# Patient Record
Sex: MALE | Race: WHITE | Employment: OTHER | URBAN - NONMETROPOLITAN AREA
[De-identification: names, ages, dates, MRNs, and addresses within clinical notes are randomized per-mention and may not be internally consistent; named-entity substitution may affect disease eponyms.]

---

## 2017-02-23 ENCOUNTER — OFFICE VISIT (OUTPATIENT)
Dept: OPTOMETRY | Age: 67
End: 2017-02-23

## 2017-02-23 DIAGNOSIS — H52.03 HYPEROPIA OF BOTH EYES WITH ASTIGMATISM AND PRESBYOPIA: Primary | ICD-10-CM

## 2017-02-23 DIAGNOSIS — H52.4 HYPEROPIA OF BOTH EYES WITH ASTIGMATISM AND PRESBYOPIA: Primary | ICD-10-CM

## 2017-02-23 DIAGNOSIS — H52.203 HYPEROPIA OF BOTH EYES WITH ASTIGMATISM AND PRESBYOPIA: Primary | ICD-10-CM

## 2017-02-23 PROCEDURE — 92014 COMPRE OPH EXAM EST PT 1/>: CPT | Performed by: OPTOMETRIST

## 2017-02-23 RX ORDER — BENOXINATE HCL/FLUORESCEIN SOD 0.4%-0.25%
1 DROPS OPHTHALMIC (EYE) ONCE
Status: COMPLETED | OUTPATIENT
Start: 2017-02-23 | End: 2017-02-23

## 2017-02-23 RX ADMIN — Medication 1 DROP: at 14:46

## 2017-02-23 ASSESSMENT — REFRACTION_CURRENTRX
OD_BASECURVE: 8.6
OD_BRAND: CIBA: AIR OPTIX FOR ASTIGMATISM
OS_BASECURVE: 8.6
OS_SPHERE: +3.25
OS_BRAND: CIBA: AIR OPTIX FOR ASTIGMATISM
OD_SPHERE: +1.25

## 2017-02-23 ASSESSMENT — REFRACTION_MANIFEST
OD_SPHERE: +1.50
OS_CYLINDER: -0.75
OD_ADD: +2.25
OS_AXIS: 095
OS_SPHERE: +1.75
OS_ADD: +2.25
OD_CYLINDER: -0.50
OD_AXIS: 060

## 2017-02-23 ASSESSMENT — TONOMETRY
OS_IOP_MMHG: 16
IOP_METHOD: APPLANATION W FLURESS DROP
OD_IOP_MMHG: 16

## 2017-02-23 ASSESSMENT — REFRACTION_WEARINGRX
OD_CYLINDER: -0.50
SPECS_TYPE: NOT FILLED
OS_SPHERE: +1.75
OD_SPHERE: +1.25
OD_AXIS: 060
OS_ADD: +2.25
OD_ADD: +2.25
OS_AXIS: 095
OS_CYLINDER: -0.50

## 2017-02-23 ASSESSMENT — SLIT LAMP EXAM - LIDS
COMMENTS: NORMAL
COMMENTS: NORMAL

## 2017-02-23 ASSESSMENT — VISUAL ACUITY
OD_SC: 20/60
OS_SC: 20/50
METHOD: SNELLEN - LINEAR

## 2018-08-20 ENCOUNTER — HOSPITAL ENCOUNTER (OUTPATIENT)
Dept: NON INVASIVE DIAGNOSTICS | Age: 68
Discharge: HOME | End: 2018-08-20
Payer: MEDICARE

## 2018-08-20 LAB
LV EF: 55 %
LVEF MODALITY: NORMAL

## 2018-08-20 PROCEDURE — 93306 TTE W/DOPPLER COMPLETE: CPT

## 2019-03-11 ENCOUNTER — OFFICE VISIT (OUTPATIENT)
Dept: OPTOMETRY | Age: 69
End: 2019-03-11
Payer: COMMERCIAL

## 2019-03-11 DIAGNOSIS — H52.203 HYPEROPIA OF BOTH EYES WITH ASTIGMATISM AND PRESBYOPIA: Primary | ICD-10-CM

## 2019-03-11 DIAGNOSIS — H52.4 HYPEROPIA OF BOTH EYES WITH ASTIGMATISM AND PRESBYOPIA: Primary | ICD-10-CM

## 2019-03-11 DIAGNOSIS — H52.03 HYPEROPIA OF BOTH EYES WITH ASTIGMATISM AND PRESBYOPIA: Primary | ICD-10-CM

## 2019-03-11 PROCEDURE — 92014 COMPRE OPH EXAM EST PT 1/>: CPT | Performed by: OPTOMETRIST

## 2019-03-11 RX ORDER — LANOLIN ALCOHOL/MO/W.PET/CERES
250 CREAM (GRAM) TOPICAL NIGHTLY
COMMUNITY
End: 2021-09-27 | Stop reason: ALTCHOICE

## 2019-03-11 ASSESSMENT — REFRACTION_WEARINGRX
OS_ADD: +2.25
OS_AXIS: 095
OD_ADD: +2.25
OS_SPHERE: +1.75
OS_CYLINDER: -0.75
SPECS_TYPE: GLASSES
OD_CYLINDER: -0.50
OD_AXIS: 060
OD_SPHERE: +1.50

## 2019-03-11 ASSESSMENT — KERATOMETRY
OS_K1POWER_DIOPTERS: 42.25
OS_AXISANGLE_DEGREES: 67
OS_AXISANGLE2_DEGREES: 157
OD_AXISANGLE2_DEGREES: 18
OD_K1POWER_DIOPTERS: 41.75
OD_K2POWER_DIOPTERS: 43.00
OS_K2POWER_DIOPTERS: 43.25
OD_AXISANGLE_DEGREES: 108

## 2019-03-11 ASSESSMENT — REFRACTION_MANIFEST
OS_ADD: +2.25
OD_CYLINDER: -0.50
OS_AXIS: 098
OD_ADD: +2.25
OD_SPHERE: +2.00
OS_SPHERE: +1.75
OD_AXIS: 060
OS_CYLINDER: -0.75

## 2019-03-11 ASSESSMENT — VISUAL ACUITY
OD_CC: 20/25
CORRECTION_TYPE: GLASSES
OS_CC: 20/20
METHOD: SNELLEN - LINEAR

## 2019-03-11 ASSESSMENT — SLIT LAMP EXAM - LIDS
COMMENTS: NORMAL
COMMENTS: NORMAL

## 2019-03-11 ASSESSMENT — TONOMETRY
OS_IOP_MMHG: 15
IOP_METHOD: NON-CONTACT AIR PUFF
OD_IOP_MMHG: 14

## 2020-01-27 ENCOUNTER — HOSPITAL ENCOUNTER (OUTPATIENT)
Age: 70
Discharge: HOME | End: 2020-01-27
Payer: MEDICARE

## 2020-01-27 ENCOUNTER — HOSPITAL ENCOUNTER (OUTPATIENT)
Dept: GENERAL RADIOLOGY | Age: 70
Discharge: HOME | End: 2020-01-27
Payer: MEDICARE

## 2020-01-27 PROCEDURE — 71046 X-RAY EXAM CHEST 2 VIEWS: CPT

## 2020-10-03 ENCOUNTER — APPOINTMENT (OUTPATIENT)
Dept: GENERAL RADIOLOGY | Age: 70
End: 2020-10-03
Payer: MEDICARE

## 2020-10-03 ENCOUNTER — HOSPITAL ENCOUNTER (EMERGENCY)
Age: 70
Discharge: HOME | End: 2020-10-04
Attending: EMERGENCY MEDICINE
Payer: MEDICARE

## 2020-10-03 VITALS
RESPIRATION RATE: 20 BRPM | SYSTOLIC BLOOD PRESSURE: 175 MMHG | DIASTOLIC BLOOD PRESSURE: 89 MMHG | HEART RATE: 68 BPM | OXYGEN SATURATION: 93 %

## 2020-10-03 PROCEDURE — 99282 EMERGENCY DEPT VISIT SF MDM: CPT

## 2020-10-03 PROCEDURE — 2709999900 HC NON-CHARGEABLE SUPPLY

## 2020-10-03 PROCEDURE — 99283 EMERGENCY DEPT VISIT LOW MDM: CPT

## 2020-10-03 PROCEDURE — 6370000000 HC RX 637 (ALT 250 FOR IP): Performed by: EMERGENCY MEDICINE

## 2020-10-03 PROCEDURE — 72100 X-RAY EXAM L-S SPINE 2/3 VWS: CPT

## 2020-10-03 PROCEDURE — L4350 ANKLE CONTROL ORTHO PRE OTS: HCPCS

## 2020-10-03 PROCEDURE — 73630 X-RAY EXAM OF FOOT: CPT

## 2020-10-03 PROCEDURE — 73610 X-RAY EXAM OF ANKLE: CPT

## 2020-10-03 RX ORDER — HYDROCODONE BITARTRATE AND ACETAMINOPHEN 5; 325 MG/1; MG/1
1 TABLET ORAL EVERY 6 HOURS PRN
Qty: 15 TABLET | Refills: 0 | Status: SHIPPED | OUTPATIENT
Start: 2020-10-03 | End: 2020-10-08

## 2020-10-03 RX ORDER — HYDROCODONE BITARTRATE AND ACETAMINOPHEN 5; 325 MG/1; MG/1
1 TABLET ORAL ONCE
Status: COMPLETED | OUTPATIENT
Start: 2020-10-03 | End: 2020-10-03

## 2020-10-03 RX ORDER — CYCLOBENZAPRINE HCL 10 MG
10 TABLET ORAL 3 TIMES DAILY PRN
Qty: 21 TABLET | Refills: 0 | Status: SHIPPED | OUTPATIENT
Start: 2020-10-03 | End: 2020-10-10

## 2020-10-03 RX ADMIN — HYDROCODONE BITARTRATE AND ACETAMINOPHEN 1 TABLET: 5; 325 TABLET ORAL at 22:37

## 2020-10-03 ASSESSMENT — PAIN SCALES - GENERAL: PAINLEVEL_OUTOF10: 10

## 2020-10-03 ASSESSMENT — PAIN DESCRIPTION - INTENSITY: RATING_2: 10

## 2020-10-03 ASSESSMENT — PAIN DESCRIPTION - LOCATION
LOCATION_2: BACK
LOCATION: ANKLE

## 2020-10-03 ASSESSMENT — PAIN DESCRIPTION - ORIENTATION
ORIENTATION_2: LOWER
ORIENTATION: RIGHT

## 2020-10-04 NOTE — ED NOTES
Presents via wheelchair with complaints of lower right leg pain and lower bilateral back pain. Patient was planting his field when he ran over by planting drill and pinned to the ground. Lower anterior right leg has small amount of swelling with very superficial abrasions noted. Complains of ankle discomfort, lateral swelling noted. Neuro/circ status intact. Patient has increase of back pain whenever he tries to move about. Patient is alert and cooperative. Skin warm and dry. Color normal for ethnicity.        Gabriela Huggins RN  10/03/20 7518

## 2020-10-04 NOTE — ED NOTES
Air cast applied to right ankle. Pt given norco starter pack, prescriptions and discharge instructions. Pt verbalized understanding and left per wheelchair. Pt in stable condition.       Lois Conti RN  10/04/20 7288

## 2020-10-04 NOTE — ED PROVIDER NOTES
[address]  [address] [address]  Phone: Traci 12 COMPLAINT    Chief Complaint   Patient presents with    Foot Injury    Back Pain       HPI    Violeta Goodman is a 69 y.o. male who presents patient had a firm tool run over his foot. Complains of pain discomfort to his right foot right ankle and states he landed on his back knocking him down. Complains of bilateral low back pain. Denies head injury loss of consciousness weakness or other injuries. PAST MEDICAL HISTORY    Past Medical History:   Diagnosis Date    COPD (chronic obstructive pulmonary disease) ([facility]     Heartburn        SURGICAL HISTORY    Past Surgical History:   Procedure Laterality Date    CHOLECYSTECTOMY  2000    KNEE ARTHROSCOPY      Left knee    OTHER SURGICAL HISTORY  10/30/2012    RIGHT VATS RLL WEDGE RESECTION    TONSILLECTOMY AND ADENOIDECTOMY         CURRENT MEDICATIONS    Current Outpatient Rx   Medication Sig Dispense Refill    cyclobenzaprine (FLEXERIL) 10 MG tablet Take 1 tablet by mouth 3 times daily as needed for Muscle spasms 21 tablet 0    HYDROcodone-acetaminophen (NORCO) 5-325 MG per tablet Take 1 tablet by mouth every 6 hours as needed for Pain for up to 5 days. 15 tablet 0    niacin 250 MG extended release capsule Take 250 mg by mouth nightly      ADVAIR DISKUS 100-50 MCG/DOSE diskus inhaler       Red Yeast Rice Extract (RED YEAST RICE PO) Take  by mouth.  multivitamin (THERAGRAN) per tablet Take 1 tablet by mouth daily.  Cholecalciferol (VITAMIN D-3) 5000 UNITS TABS Take 1 tablet by mouth daily.  Omega-3 Fatty Acids (OMEGA 3 PO) Take 1,000 mg by mouth 2 times daily.  Coenzyme Q10 (COQ10 PO) Take 200 mg by mouth daily.  Esomeprazole Magnesium (NEXIUM PO) Take 40 mg by mouth 2 times daily.          ALLERGIES    Allergies   Allergen Reactions    Ace Inhibitors        FAMILY HISTORY    Family History Problem Relation Age of Onset    Cancer Father         prostate    Arthritis Mother     Cataracts Mother     Cancer Brother         leukemia    Cataracts Sister     Glaucoma Neg Hx     Diabetes Neg Hx        SOCIAL HISTORY    Social History     Socioeconomic History    Marital status: Single     Spouse name: None    Number of children: None    Years of education: None    Highest education level: None   Occupational History     Employer: [employer]   Social Needs    Financial resource strain: None    Food insecurity     Worry: None     Inability: None    Transportation needs     Medical: None     Non-medical: None   Tobacco Use    Smoking status: Former Smoker     Packs/day: 1.00     Years: 31.00     Pack years: 31.00     Last attempt to quit: 2001     Years since quittin.1    Smokeless tobacco: Never Used   Substance and Sexual Activity    Alcohol use: No    Drug use: No    Sexual activity: None   Lifestyle    Physical activity     Days per week: None     Minutes per session: None    Stress: None   Relationships    Social connections     Talks on phone: None     Gets together: None     Attends Jew service: None     Active member of club or organization: None     Attends meetings of clubs or organizations: None     Relationship status: None    Intimate partner violence     Fear of current or ex partner: None     Emotionally abused: None     Physically abused: None     Forced sexual activity: None   Other Topics Concern    None   Social History Narrative    None       REVIEW OF SYSTEMS    Positive for ankle and foot injury. Positive for back injury without weakness  All systems negative except as marked. PHYSICAL EXAM    VITAL SIGNS: BP (!) 175/89   Pulse 68   Resp 20   SpO2 93%    Constitutional:  Alert not toxic or ill, younger than stated age  HENT: COVID mask protection in place normocephalic, Atraumatic,   Cervical Spine: Normal range of motion,  No stridor. No tenderness, Supple,  Eyes:  No discharge or  Swelling,Conjunctiva normal, PERRL, EOMI,  Respiratory: No respiratory distress, Normal breath sounds,  No wheezing, No chest tenderness. Cardiovascular:  Normal heart rate, Normal rhythm, No murmurs, No rubs, No gallops. GI:  No reproducible pain,   Musculoskeletal:  Intact distal pulses, redness and abrasion to the right anterior ankle and right lateral foot. Tenderness and swelling to that area. Tenderness in the right lateral malleolus. Some abrasion without crepitus. Back: High paralumbar and lumbar  tenderness. Integument:  Warm, Dry, No erythema, No rash (on exposed areas)   Lymphatic:  No lymphadenopathy noted. Neurologic:  Alert & oriented x 3, Normal motor function, Normal sensory function, No focal deficits noted. Straight leg tests are negative  Psychiatric:  Affect normal, Judgment normal, Mood normal.                RADIOLOGY    XR LUMBAR SPINE (2-3 VIEWS)   Final Result   Moderate L1 compression fracture with involvement of both the superior and    inferior endplates. Uncertain age. This document has been electronically signed by: Immanuel Padilla MD on    10/03/2020 11:41 PM         XR ANKLE RIGHT (MIN 3 VIEWS)   Final Result      XR FOOT RIGHT (MIN 3 VIEWS)   Final Result   1. No acute findings. This document has been electronically signed by: Immanuel Padilla MD on    10/03/2020 11:41 PM             PROCEDURES    none      CONSULTS:  None      CRITICAL CARE:  None  SCREENINGS  BP (!) 175/89   Pulse 68   Resp 20   SpO2 93%        Screening For Hypertension and Follow-up (#317)  patient informed that blood pressure is abnormal and in the pre-hypertension range and should be rechecked by primary care      Screening For Tobacco Use and Cessation Intervention (#226):   reports that he quit smoking about 19 years ago. He has a 31.00 pack-year smoking history.  He has never used smokeless tobacco.  Non-smoker not applicable for screen      ED COURSE & MEDICAL DECISION MAKING    Pertinent Labs & Imaging studies reviewed. (See chart for details)  Patient has some compression injuries to his foot and ankle at this time. Neurovascular exam is otherwise stable. Checking plain films as areas. Also complains of lumbar pain. REASSESSMENT  11:49 PM  Patient rechecked and updated on lab/xray status, progress and results. .  Patient was reassessed and condition was improved after tx. No further needs at this time. Exam here is benign. Vital signs are stable he does have moderate back pain and ankle and foot pain. X-rays of his foot and ankle are negative. Lumbar series is positive for an L1 compression fracture. He is never had one in the past.  Age undetermined although clinical exam and suggested today. Counseled him in regards the need for follow-up. He has had some spasm. I placed him on some pain pills muscle relaxant and have outpatient orthopedic referral.    FINAL IMPRESSION    1. Closed compression fracture of body of L1 vertebra (HCC)    2. Sprain of right foot, initial encounter    3. Sprain of right ankle, unspecified ligament, initial encounter         PATIENT REFERRED TO:  Destiny Hill MD  [address]  [phone]    Call   For evaluation      DISCHARGE MEDICATIONS:  New Prescriptions    CYCLOBENZAPRINE (FLEXERIL) 10 MG TABLET    Take 1 tablet by mouth 3 times daily as needed for Muscle spasms    HYDROCODONE-ACETAMINOPHEN (NORCO) 5-325 MG PER TABLET    Take 1 tablet by mouth every 6 hours as needed for Pain for up to 5 days.            Dilshad Restrepo MD  10/03/20 8015

## 2020-10-20 ENCOUNTER — HOSPITAL ENCOUNTER (OUTPATIENT)
Dept: MRI IMAGING | Age: 70
Discharge: HOME | End: 2020-10-20
Payer: MEDICARE

## 2020-10-20 PROCEDURE — 72148 MRI LUMBAR SPINE W/O DYE: CPT

## 2020-11-04 ENCOUNTER — OFFICE VISIT (OUTPATIENT)
Dept: PHYSICAL MEDICINE AND REHAB | Age: 70
End: 2020-11-04
Payer: MEDICARE

## 2020-11-04 VITALS
TEMPERATURE: 97.3 F | HEIGHT: 74 IN | SYSTOLIC BLOOD PRESSURE: 124 MMHG | DIASTOLIC BLOOD PRESSURE: 62 MMHG | WEIGHT: 209 LBS | BODY MASS INDEX: 26.82 KG/M2

## 2020-11-04 PROCEDURE — 99205 OFFICE O/P NEW HI 60 MIN: CPT | Performed by: PAIN MEDICINE

## 2020-11-04 ASSESSMENT — ENCOUNTER SYMPTOMS
NAUSEA: 0
BOWEL INCONTINENCE: 0
BACK PAIN: 1
CHEST TIGHTNESS: 0
COLOR CHANGE: 0
CONSTIPATION: 0
SHORTNESS OF BREATH: 0
DIARRHEA: 0
PHOTOPHOBIA: 0
RHINORRHEA: 0
ABDOMINAL PAIN: 0
COUGH: 0
SINUS PRESSURE: 0
SORE THROAT: 0
EYE PAIN: 0
WHEEZING: 0
VOMITING: 0

## 2020-11-04 NOTE — LETTER
[address] [facility]  [address] [facility] [address] [place] [address]  Phone: [phone]  Fax: [phone]    Royer Hernández        November 4, 2020       Patient: Ge Busch   MR Number: [id number]   YOB: 1950   Date of Visit: 11/4/2020       Dear Yariel Garcia: Thank you for the request for consultation for Ruby Gifford to me for the evaluation of back pain. Below are the relevant portions of my assessment and plan of care. If you have questions, please do not hesitate to call me. I look forward to following Kevin Werner along with you.     Sincerely,        Nely Chew MD    CC providers:  Yariel Garcia PA-C  [address] [facility] [address] [place] [address] [facility], [address]  [address] [place] [address] 69: [phone]

## 2020-11-04 NOTE — PROGRESS NOTES
ChiefComplaint: Low back pain    Back Pain   Chronicity: Patient is a pleasant 68 y/o male come with back pain. 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 States developed severe and stabbing pain. The problem occurs constantly. The problem has been rapidly worsening since onset. The pain is present in the lumbar spine and thoracic spine. The quality of the pain is described as stabbing and shooting. The pain does not radiate. The pain is at a severity of 9/10. The pain is severe. The pain is the same all the time. The symptoms are aggravated by bending, position, standing, sitting and coughing. Pertinent negatives include no abdominal pain, bladder incontinence, bowel incontinence, chest pain, fever, headaches, numbness or weakness. He has tried NSAIDs, muscle relaxant and analgesics (Brace) for the symptoms. The treatment provided no relief. MRI LUMBAR SPINE 10/20/2020:    FINDINGS:    There is an anterior wedge shape configuration of the T12 vertebral body with approximately 80% anterior height loss which has mildly worsened compared to prior radiographs dated 10/3/2020 and new compared to prior PET/CT. There is minimal, grade 1,    anterolisthesis of L3 relative to L4 on the basis of degenerative change, stable compared to prior exams. There is otherwise anatomic vertebral body height and alignment. There is edema throughout the T12 vertebral body. There is hyperintense T1 and T2    signal at the opposing endplates of A4-4 through L5-S1 with associated osteophytes as evidence for Modic 2 degenerative change. There are scattered focal areas of hyperintense T1 and T2 signal is evidence for hemangiomas. The conus terminates in a normal     fashion at the T12-L1 level. Paraspinal soft tissues are unremarkable. At T11-12 there is a shallow disc bulge without significant spinal canal or neuroforaminal stenosis.     At T12-L1 there is a shallow disc bulge without significant spinal canal or neuroforaminal stenosis. At L1-2 there is no significant spinal canal stenosis. There is mild left neural foraminal stenosis in association with facet hypertrophy and ligamentum flavum thickening. At L2-3 there is a minimal disc bulge without significant spinal canal stenosis. There is mild bilateral neural foraminal stenosis in association with facet hypertrophy and ligamentum flavum thickening. At L3-4 there is minimal partial uncovering the disc with superimposed shallow disc bulge which mildly indents the thecal sac and contributes to mild spinal canal stenosis and appears to contact traversing L4 nerve roots bilaterally. There is moderate    bilateral neural foraminal stenosis in association with facet hypertrophy and ligamentum flavum thickening. At L4-5 there is a shallow disc bulge without significant spinal canal stenosis and moderate left and mild-to-moderate right neuroforaminal stenosis in association with facet hypertrophy and ligamentum flavum thickening. At L5-S1 there is a shallow disc bulge without significant spinal canal stenosis and mild to moderate bilateral neural foraminal stenosis in association with facet hypertrophy and ligament flavum thickening.              Impression         1. Acute compression deformity of T12 which has mildly worsened in the interval since prior radiographs dated 10/3/2020 with approximately 80% anterior height loss at this level. 2. Multilevel degenerative changes of the lumbar spine most pronounced at L3-4 where there is mild spinal canal stenosis and moderate bilateral neural foraminal stenosis.               The patient is allergic to ace inhibitors. Dyana Cole  has a past medical history of COPD (chronic obstructive pulmonary disease) (HCC) and Heartburn. Past Surgical History  The patient  has a past surgical history that includes Cholecystectomy (2000);  Knee arthroscopy; Tonsillectomy and adenoidectomy; and other surgical history (10/30/2012). Family History  This patient's family history includes Arthritis in his mother; Cancer in his brother and father; Cataracts in his mother and sister. Social History  Samra Lee  reports that he quit smoking about 19 years ago. He has a 31.00 pack-year smoking history. He has never used smokeless tobacco. He reports that he does not drink alcohol or use drugs. Medications    Current Outpatient Medications:     niacin 250 MG extended release capsule, Take 250 mg by mouth nightly, Disp: , Rfl:     ADVAIR DISKUS 100-50 MCG/DOSE diskus inhaler, , Disp: , Rfl:     Red Yeast Rice Extract (RED YEAST RICE PO), Take  by mouth., Disp: , Rfl:     multivitamin (THERAGRAN) per tablet, Take 1 tablet by mouth daily. , Disp: , Rfl:     Cholecalciferol (VITAMIN D-3) 5000 UNITS TABS, Take 1 tablet by mouth daily. , Disp: , Rfl:     Omega-3 Fatty Acids (OMEGA 3 PO), Take 1,000 mg by mouth 2 times daily. , Disp: , Rfl:     Coenzyme Q10 (COQ10 PO), Take 200 mg by mouth daily. , Disp: , Rfl:     Esomeprazole Magnesium (NEXIUM PO), Take 40 mg by mouth 2 times daily. , Disp: , Rfl:     Subjective:      Review of Systems   Constitutional: Positive for activity change. Negative for appetite change, chills, diaphoresis, fatigue, fever and unexpected weight change. HENT: Positive for hearing loss. Negative for congestion, ear pain, mouth sores, nosebleeds, rhinorrhea, sinus pressure and sore throat. Eyes: Negative for photophobia, pain and visual disturbance. Respiratory: Negative for cough, chest tightness, shortness of breath and wheezing. Cardiovascular: Negative for chest pain and palpitations. Gastrointestinal: Negative for abdominal pain, bowel incontinence, constipation, diarrhea, nausea and vomiting. Endocrine: Negative for cold intolerance, heat intolerance, polydipsia, polyphagia and polyuria.    Genitourinary: Negative for bladder incontinence, decreased urine volume, difficulty urinating, frequency and hematuria. Musculoskeletal: Positive for back pain and gait problem. Negative for arthralgias, joint swelling, myalgias, neck pain and neck stiffness. Skin: Negative for color change and rash. Allergic/Immunologic: Negative for food allergies and immunocompromised state. Neurological: Negative for dizziness, tremors, seizures, syncope, facial asymmetry, speech difficulty, weakness, light-headedness, numbness and headaches. Hematological: Does not bruise/bleed easily. Psychiatric/Behavioral: Negative for agitation, behavioral problems, confusion, decreased concentration, dysphoric mood, hallucinations, self-injury, sleep disturbance and suicidal ideas. The patient is not nervous/anxious and is not hyperactive. Objective:     Vitals:    11/04/20 1435   BP: 124/62   Temp: 97.3 °F (36.3 °C)   Weight: 209 lb (94.8 kg)   Height: 6' 2\" (1.88 m)       Physical Exam  Vitals signs and nursing note reviewed. Constitutional:       General: He is not in acute distress. Appearance: He is well-developed. He is not diaphoretic. HENT:      Head: Normocephalic and atraumatic. Right Ear: External ear normal.      Left Ear: External ear normal.      Nose: Nose normal.      Mouth/Throat:      Pharynx: No oropharyngeal exudate. Eyes:      General: No scleral icterus. Right eye: No discharge. Left eye: No discharge. Conjunctiva/sclera: Conjunctivae normal.      Pupils: Pupils are equal, round, and reactive to light. Neck:      Musculoskeletal: Full passive range of motion without pain, normal range of motion and neck supple. Normal range of motion. No edema, erythema, neck rigidity or muscular tenderness. Thyroid: No thyromegaly. Cardiovascular:      Rate and Rhythm: Normal rate and regular rhythm. Heart sounds: Normal heart sounds. No murmur. No friction rub. No gallop.     Pulmonary: Effort: Pulmonary effort is normal. No respiratory distress. Breath sounds: Normal breath sounds. No wheezing or rales. Chest:      Chest wall: No tenderness. Abdominal:      General: Bowel sounds are normal. There is no distension. Palpations: Abdomen is soft. Tenderness: There is no abdominal tenderness. There is no guarding or rebound. Musculoskeletal:      Right hip: He exhibits no tenderness. Left hip: He exhibits no tenderness. Right knee: He exhibits normal range of motion. No tenderness found. Left knee: He exhibits normal range of motion. No tenderness found. Right ankle: He exhibits normal range of motion and no swelling. Left ankle: He exhibits normal range of motion and no swelling. Cervical back: He exhibits normal range of motion and no tenderness. Thoracic back: He exhibits decreased range of motion, tenderness, pain and spasm. Lumbar back: He exhibits decreased range of motion, tenderness and pain. Right lower leg: He exhibits no swelling. Left lower leg: He exhibits no swelling. Right foot: No swelling. Left foot: No swelling. Skin:     General: Skin is warm. Coloration: Skin is not pale. Findings: No erythema or rash. Neurological:      Mental Status: He is alert and oriented to person, place, and time. He is not disoriented. Cranial Nerves: No cranial nerve deficit. Sensory: No sensory deficit. Motor: No atrophy or abnormal muscle tone. Coordination: Coordination normal.      Gait: Gait abnormal.      Deep Tendon Reflexes: Reflexes are normal and symmetric. Babinski sign absent on the right side. Babinski sign absent on the left side. Comments: SLR- NEGATIVE   Psychiatric:         Attention and Perception: Attention normal. He is attentive. Mood and Affect: Mood normal. Mood is not anxious or depressed. Affect is not labile, blunt, angry or inappropriate.          Speech: patient about risks with procedure including infection, reaction to medication, increased pain, or bleeding.  Medications: Reviewed .  Ordered CBC, BMP and EKG      Previous Treatments tried:  · PT: No,  Severe pain not able to tolerate. · NSAIDs: Yes,  any benefit? Yes,  how long taken: takes  · Chiropractic: No,  Not indicated  · Muscle relaxants: Yes,  any benefit? Yes  · Narcotics: No,  any benefit? No  · Spine surgeon consult: Yes  · Any Implants: No    Meds. Prescribed:   No orders of the defined types were placed in this encounter. Return Kyphoplasty @ T12 and other levels id needed. .     Time spent with patient was 60 minutes more than 50% was spent  Counseling/coordinated the patient'scare.     Electronically signed by Francois Son MD on 11/4/2020 at 4:38 PM

## 2020-11-05 ENCOUNTER — TELEPHONE (OUTPATIENT)
Dept: PHYSICAL MEDICINE AND REHAB | Age: 70
End: 2020-11-05

## 2020-11-05 NOTE — TELEPHONE ENCOUNTER
Pt. In the office. States that he just had a punch biopsy on his back  by Dr. Erum Pineda to evaluate for Grovers Disease. Does not go back to get his sutures removed and have his follow up until 11/20/2020. Procedure rescheduled to 11/30/2020 @ 1:10pm, arrive at 12:10pm. Follow up 12/14/2020 @ 8:45am. Covid test 11/23/2020 along with EKG. Colin RUST Notified.

## 2020-11-09 ENCOUNTER — TELEPHONE (OUTPATIENT)
Dept: PHYSICAL MEDICINE AND REHAB | Age: 70
End: 2020-11-09

## 2020-11-20 RX ORDER — SUCRALFATE 1 G/1
TABLET ORAL
COMMUNITY
Start: 2020-11-04

## 2020-11-20 RX ORDER — PANTOPRAZOLE SODIUM 40 MG/1
TABLET, DELAYED RELEASE ORAL
COMMUNITY
Start: 2020-10-12 | End: 2022-05-20

## 2020-11-20 NOTE — PROGRESS NOTES
NPO after midnight except sip of water with heart/BP meds  Follow all instructions given by surgeon including medications to hold  Bring insurance card and photo ID  Shower the night before or morning of procedure with liquid antibacterial soap  Wear comfortable clothing  Do not bring jewelry or valuables  Bring list of medications with dosage and how often taken if not reviewed   needed at discharge at least 24years old  Call PAT at [phone] for questions    Instructed to call surgery center at [phone] upon arrival to speak with  before entering building. Covid screen due  at [facility] 6 to 7 days before procedure. Pt plans to have completed on 11/23/2020 at [facility] outpatient express with labs/EKG. In preparation for their surgical procedure above patient was screened for Obstructive Sleep Apnea (CLAYTON) using the STOP-Bang Questionnaire by the Pre-Admission Testing department. This is a pre-surgical screening tool for patient safety and serves as a recommendation, this WILL NOT cause cancellation of surgery. STOP-Bang Questionnaire  * Do you currently see a pulmonologist?  No        1. Do you snore loudly (able to be heard in the next room)? No    2. Do you often feel tired or sleepy during the daytime? No       3. Has anyone ever told you that you stop breathing during your sleep? No    4. Do you have or are you being treated for high blood pressure? No      5. BMI more than 35? BMI (Calculated): 29.5        No    6. Age over 48 years? 69 y.o. Yes    7. Neck Circumference greater than 17 inches for male or 16 inches for female? Measured           (visits only)            Not Applicable    8. Gender Male?                  Yes      TOTAL SCORE: 2    CLAYTON - Low Risk : Yes to 0 - 2 questions  CLAYTON - Intermediate Risk : Yes to 3 - 4 questions  CLAYTON - High Risk : Yes to 5 - 8 questions    Adapted from:   STOP Questionnaire: A Tool to

## 2020-11-23 ENCOUNTER — HOSPITAL ENCOUNTER (OUTPATIENT)
Age: 70
Discharge: HOME | End: 2020-11-23
Payer: MEDICARE

## 2020-11-23 LAB
EKG ATRIAL RATE: 79 BPM
EKG P AXIS: 72 DEGREES
EKG P-R INTERVAL: 168 MS
EKG Q-T INTERVAL: 366 MS
EKG QRS DURATION: 110 MS
EKG QTC CALCULATION (BAZETT): 419 MS
EKG R AXIS: 18 DEGREES
EKG T AXIS: 57 DEGREES
EKG VENTRICULAR RATE: 79 BPM

## 2020-11-23 PROCEDURE — U0003 INFECTIOUS AGENT DETECTION BY NUCLEIC ACID (DNA OR RNA); SEVERE ACUTE RESPIRATORY SYNDROME CORONAVIRUS 2 (SARS-COV-2) (CORONAVIRUS DISEASE [COVID-19]), AMPLIFIED PROBE TECHNIQUE, MAKING USE OF HIGH THROUGHPUT TECHNOLOGIES AS DESCRIBED BY CMS-2020-01-R: HCPCS

## 2020-11-23 PROCEDURE — 93005 ELECTROCARDIOGRAM TRACING: CPT

## 2020-11-24 LAB — SARS-COV-2: NOT DETECTED

## 2020-11-24 PROCEDURE — 93010 ELECTROCARDIOGRAM REPORT: CPT | Performed by: INTERNAL MEDICINE

## 2020-11-24 NOTE — PROGRESS NOTES
Spoke with Bi Gutierrez at Dr. Nael Blanco office of need for preop labs. Voiced understanding and she will reach out to patient and updated PAT.

## 2020-11-30 ENCOUNTER — APPOINTMENT (OUTPATIENT)
Dept: GENERAL RADIOLOGY | Age: 70
End: 2020-11-30
Attending: PAIN MEDICINE
Payer: MEDICARE

## 2020-11-30 ENCOUNTER — HOSPITAL ENCOUNTER (OUTPATIENT)
Age: 70
Setting detail: OUTPATIENT SURGERY
Discharge: HOME | End: 2020-11-30
Attending: PAIN MEDICINE | Admitting: PAIN MEDICINE
Payer: MEDICARE

## 2020-11-30 ENCOUNTER — ANESTHESIA EVENT (OUTPATIENT)
Dept: OPERATING ROOM | Age: 70
End: 2020-11-30
Payer: MEDICARE

## 2020-11-30 ENCOUNTER — ANESTHESIA (OUTPATIENT)
Dept: OPERATING ROOM | Age: 70
End: 2020-11-30
Payer: MEDICARE

## 2020-11-30 VITALS
SYSTOLIC BLOOD PRESSURE: 138 MMHG | HEIGHT: 74 IN | HEART RATE: 70 BPM | TEMPERATURE: 97.3 F | RESPIRATION RATE: 14 BRPM | OXYGEN SATURATION: 97 % | BODY MASS INDEX: 28.23 KG/M2 | DIASTOLIC BLOOD PRESSURE: 63 MMHG | WEIGHT: 220 LBS

## 2020-11-30 VITALS
OXYGEN SATURATION: 98 % | SYSTOLIC BLOOD PRESSURE: 108 MMHG | RESPIRATION RATE: 5 BRPM | DIASTOLIC BLOOD PRESSURE: 59 MMHG | TEMPERATURE: 98.6 F

## 2020-11-30 PROCEDURE — 7100000010 HC PHASE II RECOVERY - FIRST 15 MIN: Performed by: PAIN MEDICINE

## 2020-11-30 PROCEDURE — 7100000000 HC PACU RECOVERY - FIRST 15 MIN: Performed by: PAIN MEDICINE

## 2020-11-30 PROCEDURE — 3700000000 HC ANESTHESIA ATTENDED CARE: Performed by: PAIN MEDICINE

## 2020-11-30 PROCEDURE — 3600000004 HC SURGERY LEVEL 4 BASE: Performed by: PAIN MEDICINE

## 2020-11-30 PROCEDURE — 88305 TISSUE EXAM BY PATHOLOGIST: CPT

## 2020-11-30 PROCEDURE — 7100000011 HC PHASE II RECOVERY - ADDTL 15 MIN: Performed by: PAIN MEDICINE

## 2020-11-30 PROCEDURE — 2580000003 HC RX 258: Performed by: NURSE ANESTHETIST, CERTIFIED REGISTERED

## 2020-11-30 PROCEDURE — 22513 PERQ VERTEBRAL AUGMENTATION: CPT | Performed by: PAIN MEDICINE

## 2020-11-30 PROCEDURE — 7100000001 HC PACU RECOVERY - ADDTL 15 MIN: Performed by: PAIN MEDICINE

## 2020-11-30 PROCEDURE — C1894 INTRO/SHEATH, NON-LASER: HCPCS | Performed by: PAIN MEDICINE

## 2020-11-30 PROCEDURE — 2720000010 HC SURG SUPPLY STERILE: Performed by: PAIN MEDICINE

## 2020-11-30 PROCEDURE — 3700000001 HC ADD 15 MINUTES (ANESTHESIA): Performed by: PAIN MEDICINE

## 2020-11-30 PROCEDURE — 3209999900 FLUORO FOR SURGICAL PROCEDURES

## 2020-11-30 PROCEDURE — 3600000014 HC SURGERY LEVEL 4 ADDTL 15MIN: Performed by: PAIN MEDICINE

## 2020-11-30 PROCEDURE — 2500000003 HC RX 250 WO HCPCS: Performed by: PAIN MEDICINE

## 2020-11-30 PROCEDURE — C1713 ANCHOR/SCREW BN/BN,TIS/BN: HCPCS | Performed by: PAIN MEDICINE

## 2020-11-30 PROCEDURE — 6360000004 HC RX CONTRAST MEDICATION: Performed by: PAIN MEDICINE

## 2020-11-30 PROCEDURE — 2709999900 HC NON-CHARGEABLE SUPPLY: Performed by: PAIN MEDICINE

## 2020-11-30 PROCEDURE — 2500000003 HC RX 250 WO HCPCS: Performed by: NURSE ANESTHETIST, CERTIFIED REGISTERED

## 2020-11-30 PROCEDURE — 6370000000 HC RX 637 (ALT 250 FOR IP): Performed by: PAIN MEDICINE

## 2020-11-30 PROCEDURE — 6360000002 HC RX W HCPCS: Performed by: NURSE ANESTHETIST, CERTIFIED REGISTERED

## 2020-11-30 DEVICE — BONE CEMENT CX01A XPEDE US
Type: IMPLANTABLE DEVICE | Status: FUNCTIONAL
Brand: KYPHON® XPEDE™ BONE CEMENT

## 2020-11-30 RX ORDER — SUCCINYLCHOLINE/SOD CL,ISO/PF 200MG/10ML
SYRINGE (ML) INTRAVENOUS PRN
Status: DISCONTINUED | OUTPATIENT
Start: 2020-11-30 | End: 2020-11-30 | Stop reason: SDUPTHER

## 2020-11-30 RX ORDER — FENTANYL CITRATE 50 UG/ML
25 INJECTION, SOLUTION INTRAMUSCULAR; INTRAVENOUS EVERY 5 MIN PRN
Status: DISCONTINUED | OUTPATIENT
Start: 2020-11-30 | End: 2020-11-30 | Stop reason: HOSPADM

## 2020-11-30 RX ORDER — DIPHENHYDRAMINE HYDROCHLORIDE 50 MG/ML
12.5 INJECTION INTRAMUSCULAR; INTRAVENOUS
Status: DISCONTINUED | OUTPATIENT
Start: 2020-11-30 | End: 2020-11-30 | Stop reason: HOSPADM

## 2020-11-30 RX ORDER — FENTANYL CITRATE 50 UG/ML
INJECTION, SOLUTION INTRAMUSCULAR; INTRAVENOUS PRN
Status: DISCONTINUED | OUTPATIENT
Start: 2020-11-30 | End: 2020-11-30 | Stop reason: SDUPTHER

## 2020-11-30 RX ORDER — HYDROCODONE BITARTRATE AND ACETAMINOPHEN 5; 325 MG/1; MG/1
1 TABLET ORAL ONCE
Status: COMPLETED | OUTPATIENT
Start: 2020-11-30 | End: 2020-11-30

## 2020-11-30 RX ORDER — METOCLOPRAMIDE HYDROCHLORIDE 5 MG/ML
10 INJECTION INTRAMUSCULAR; INTRAVENOUS
Status: DISCONTINUED | OUTPATIENT
Start: 2020-11-30 | End: 2020-11-30 | Stop reason: HOSPADM

## 2020-11-30 RX ORDER — PROMETHAZINE HYDROCHLORIDE 25 MG/ML
12.5 INJECTION, SOLUTION INTRAMUSCULAR; INTRAVENOUS
Status: DISCONTINUED | OUTPATIENT
Start: 2020-11-30 | End: 2020-11-30 | Stop reason: HOSPADM

## 2020-11-30 RX ORDER — LIDOCAINE HYDROCHLORIDE 10 MG/ML
INJECTION, SOLUTION INFILTRATION; PERINEURAL PRN
Status: DISCONTINUED | OUTPATIENT
Start: 2020-11-30 | End: 2020-11-30 | Stop reason: SDUPTHER

## 2020-11-30 RX ORDER — LIDOCAINE HYDROCHLORIDE AND EPINEPHRINE 10; 10 MG/ML; UG/ML
INJECTION, SOLUTION INFILTRATION; PERINEURAL PRN
Status: DISCONTINUED | OUTPATIENT
Start: 2020-11-30 | End: 2020-11-30 | Stop reason: ALTCHOICE

## 2020-11-30 RX ORDER — SODIUM CHLORIDE 9 MG/ML
INJECTION, SOLUTION INTRAVENOUS CONTINUOUS PRN
Status: DISCONTINUED | OUTPATIENT
Start: 2020-11-30 | End: 2020-11-30 | Stop reason: SDUPTHER

## 2020-11-30 RX ORDER — PROPOFOL 10 MG/ML
INJECTION, EMULSION INTRAVENOUS PRN
Status: DISCONTINUED | OUTPATIENT
Start: 2020-11-30 | End: 2020-11-30 | Stop reason: SDUPTHER

## 2020-11-30 RX ORDER — MEPERIDINE HYDROCHLORIDE 25 MG/ML
12.5 INJECTION INTRAMUSCULAR; INTRAVENOUS; SUBCUTANEOUS EVERY 5 MIN PRN
Status: DISCONTINUED | OUTPATIENT
Start: 2020-11-30 | End: 2020-11-30 | Stop reason: HOSPADM

## 2020-11-30 RX ORDER — LABETALOL 20 MG/4 ML (5 MG/ML) INTRAVENOUS SYRINGE
5 EVERY 10 MIN PRN
Status: DISCONTINUED | OUTPATIENT
Start: 2020-11-30 | End: 2020-11-30 | Stop reason: HOSPADM

## 2020-11-30 RX ORDER — FENTANYL CITRATE 50 UG/ML
50 INJECTION, SOLUTION INTRAMUSCULAR; INTRAVENOUS EVERY 5 MIN PRN
Status: DISCONTINUED | OUTPATIENT
Start: 2020-11-30 | End: 2020-11-30 | Stop reason: HOSPADM

## 2020-11-30 RX ORDER — ROCURONIUM BROMIDE 10 MG/ML
INJECTION, SOLUTION INTRAVENOUS PRN
Status: DISCONTINUED | OUTPATIENT
Start: 2020-11-30 | End: 2020-11-30 | Stop reason: SDUPTHER

## 2020-11-30 RX ORDER — CEFAZOLIN SODIUM 1 G/3ML
INJECTION, POWDER, FOR SOLUTION INTRAMUSCULAR; INTRAVENOUS PRN
Status: DISCONTINUED | OUTPATIENT
Start: 2020-11-30 | End: 2020-11-30 | Stop reason: SDUPTHER

## 2020-11-30 RX ORDER — HYDROCODONE BITARTRATE AND ACETAMINOPHEN 5; 325 MG/1; MG/1
1 TABLET ORAL EVERY 6 HOURS PRN
Qty: 28 TABLET | Refills: 0 | Status: SHIPPED | OUTPATIENT
Start: 2020-11-30 | End: 2020-12-07

## 2020-11-30 RX ADMIN — ROCURONIUM BROMIDE 35 MG: 10 INJECTION INTRAVENOUS at 13:53

## 2020-11-30 RX ADMIN — Medication 160 MG: at 13:47

## 2020-11-30 RX ADMIN — ROCURONIUM BROMIDE 5 MG: 10 INJECTION INTRAVENOUS at 13:47

## 2020-11-30 RX ADMIN — LIDOCAINE HYDROCHLORIDE 50 MG: 10 INJECTION, SOLUTION INFILTRATION; PERINEURAL at 13:47

## 2020-11-30 RX ADMIN — FENTANYL CITRATE 100 MCG: 50 INJECTION, SOLUTION INTRAMUSCULAR; INTRAVENOUS at 13:39

## 2020-11-30 RX ADMIN — HYDROCODONE BITARTRATE AND ACETAMINOPHEN 1 TABLET: 5; 325 TABLET ORAL at 15:36

## 2020-11-30 RX ADMIN — SODIUM CHLORIDE: 9 INJECTION, SOLUTION INTRAVENOUS at 13:36

## 2020-11-30 RX ADMIN — SUGAMMADEX 200 MG: 100 INJECTION, SOLUTION INTRAVENOUS at 14:38

## 2020-11-30 RX ADMIN — CEFAZOLIN 2000 MG: 1 INJECTION, POWDER, FOR SOLUTION INTRAMUSCULAR; INTRAVENOUS; PARENTERAL at 13:51

## 2020-11-30 RX ADMIN — PROPOFOL 200 MG: 10 INJECTION, EMULSION INTRAVENOUS at 13:47

## 2020-11-30 ASSESSMENT — PULMONARY FUNCTION TESTS
PIF_VALUE: 17
PIF_VALUE: 19
PIF_VALUE: 17
PIF_VALUE: 13
PIF_VALUE: 19
PIF_VALUE: 18
PIF_VALUE: 18
PIF_VALUE: 17
PIF_VALUE: 19
PIF_VALUE: 1
PIF_VALUE: 3
PIF_VALUE: 17
PIF_VALUE: 2
PIF_VALUE: 13
PIF_VALUE: 18
PIF_VALUE: 0
PIF_VALUE: 2
PIF_VALUE: 0
PIF_VALUE: 0
PIF_VALUE: 18
PIF_VALUE: 19
PIF_VALUE: 12
PIF_VALUE: 17
PIF_VALUE: 19
PIF_VALUE: 17
PIF_VALUE: 0
PIF_VALUE: 5
PIF_VALUE: 17
PIF_VALUE: 19
PIF_VALUE: 18
PIF_VALUE: 18
PIF_VALUE: 20
PIF_VALUE: 17
PIF_VALUE: 17
PIF_VALUE: 2
PIF_VALUE: 16
PIF_VALUE: 19
PIF_VALUE: 17
PIF_VALUE: 17
PIF_VALUE: 1
PIF_VALUE: 17
PIF_VALUE: 20
PIF_VALUE: 22
PIF_VALUE: 1
PIF_VALUE: 16
PIF_VALUE: 18
PIF_VALUE: 19
PIF_VALUE: 16
PIF_VALUE: 18
PIF_VALUE: 19
PIF_VALUE: 19
PIF_VALUE: 14
PIF_VALUE: 17
PIF_VALUE: 17
PIF_VALUE: 18
PIF_VALUE: 19
PIF_VALUE: 19
PIF_VALUE: 17
PIF_VALUE: 1
PIF_VALUE: 0
PIF_VALUE: 17
PIF_VALUE: 16

## 2020-11-30 ASSESSMENT — PAIN - FUNCTIONAL ASSESSMENT: PAIN_FUNCTIONAL_ASSESSMENT: 0-10

## 2020-11-30 ASSESSMENT — PAIN DESCRIPTION - DESCRIPTORS: DESCRIPTORS: ACHING

## 2020-11-30 ASSESSMENT — PAIN SCALES - GENERAL: PAINLEVEL_OUTOF10: 4

## 2020-11-30 ASSESSMENT — COPD QUESTIONNAIRES: CAT_SEVERITY: MODERATE

## 2020-11-30 NOTE — ANESTHESIA PRE PROCEDURE
Department of Anesthesiology  Preprocedure Note       Name:  Antony Alejandro   Age:  69 y.o.  :  1950                                          MRN:  [id number]         Date:  2020      Surgeon: Meagan Juares):  Jelani Hernandez MD    Procedure: Procedure(s):  Kyphoplasty @ T12 and other levels needed    Medications prior to admission:   Prior to Admission medications    Medication Sig Start Date End Date Taking? Authorizing Provider   Budesonide-Formoterol Fumarate (SYMBICORT IN) Inhale into the lungs daily   Yes Historical Provider, MD   sucralfate (CARAFATE) 1 GM tablet  20  Yes Historical Provider, MD   pantoprazole (PROTONIX) 40 MG tablet  10/12/20  Yes Historical Provider, MD   niacin 250 MG extended release capsule Take 250 mg by mouth nightly   Yes Historical Provider, MD   Red Yeast Rice Extract (RED YEAST RICE PO) Take  by mouth. Yes Historical Provider, MD   multivitamin [facility]) per tablet Take 1 tablet by mouth daily. Yes Historical Provider, MD   Cholecalciferol (VITAMIN D-3) 5000 UNITS TABS Take 1 tablet by mouth daily. Yes Historical Provider, MD   Coenzyme Q10 (COQ10 PO) Take 200 mg by mouth daily. Yes Historical Provider, MD       Current medications:    No current facility-administered medications for this encounter. 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    Hyperopia of both eyes with astigmatism and presbyopia H52.03, H52.203, H52.4    Hyperopia of both eyes with astigmatism H52.03, H52.203       Past Medical History:        Diagnosis Date    COPD (chronic obstructive pulmonary disease) ([facility] [address]     Heartburn        Past Surgical History:        Procedure Laterality Date    CHOLECYSTECTOMY    KNEE ARTHROSCOPY      Left knee    OTHER SURGICAL HISTORY  10/30/2012    RIGHT VATS RLL WEDGE RESECTION    TONSILLECTOMY AND ADENOIDECTOMY         Social History:    Social History     Tobacco Use    Smoking status: Former Smoker     Packs/day: 1.00     Years: 31.00     Pack years: 31.00     Last attempt to quit: 2001     Years since quittin.2    Smokeless tobacco: Never Used   Substance Use Topics    Alcohol use: No                                Counseling given: Not Answered      Vital Signs (Current):   Vitals:    20 1521 20 1226   BP:  (!) 158/78   Pulse:  77   Resp:  16   Temp:  94.1 °F (34.5 °C)   TempSrc:  Temporal   SpO2:  91%   Weight: 229 lb (103.9 kg) 220 lb (99.8 kg)   Height: 6' 2\" (1.88 m) 6' 2\" (1.88 m)                                              BP Readings from Last 3 Encounters:   20 (!) 158/78   20 124/62   10/03/20 (!) 175/89       NPO Status: Time of last liquid consumption:                         Time of last solid consumption:                         Date of last liquid consumption: 20                        Date of last solid food consumption: 20    BMI:   Wt Readings from Last 3 Encounters:   20 220 lb (99.8 kg)   20 209 lb (94.8 kg)   10/30/12 210 lb 9.6 oz (95.5 kg)     Body mass index is 28.25 kg/m².     CBC:   Lab Results   Component Value Date    WBC 6.5 2015    RBC 5.16 2015    RBC 4.92 2011    HGB 16.4 2015    HCT 48.1 2015    MCV 93.1 2015    RDW 12.5 2015     2015       CMP:   Lab Results   Component Value Date     2015    K 4.3 2015     2015    CO2 27 2015    BUN 16 2015    CREATININE 0.8 2015    LABGLOM >90 2015    GLUCOSE 99 2015    GLUCOSE 79 2011    PROT 7.0 2015    CALCIUM 9.2 2015    BILITOT 0.6 2015    ALKPHOS 79 2015    AST 23 2015    ALT 24 09/24/2015       POC Tests: No results for input(s): POCGLU, POCNA, POCK, POCCL, POCBUN, POCHEMO, POCHCT in the last 72 hours. Coags: No results found for: PROTIME, INR, APTT    HCG (If Applicable): No results found for: PREGTESTUR, PREGSERUM, HCG, HCGQUANT     ABGs: No results found for: PHART, PO2ART, BII9KUP, SBL2DSK, BEART, R5ZWDIKE     Type & Screen (If Applicable):  No results found for: LABABO, LABRH    Drug/Infectious Status (If Applicable):  No results found for: HIV, HEPCAB    COVID-19 Screening (If Applicable):   Lab Results   Component Value Date    COVID19 Not Detected 11/23/2020         Anesthesia Evaluation  Patient summary reviewed no history of anesthetic complications:   Airway: Mallampati: II  TM distance: >3 FB   Neck ROM: full  Mouth opening: > = 3 FB Dental:          Pulmonary:   (+) COPD: moderate,  decreased breath sounds,                            ROS comment: Former smoker   Cardiovascular:                      Neuro/Psych:               GI/Hepatic/Renal:             Endo/Other:                     Abdominal:           Vascular:                                        Anesthesia Plan      general     ASA 3       Induction: intravenous. MIPS: Postoperative opioids intended and Prophylactic antiemetics administered. Anesthetic plan and risks discussed with patient.       Plan discussed with CRNA and surgical team.                  Pepe Mccollum MD   11/30/2020

## 2020-11-30 NOTE — H&P
[address] [address]  History and Physical Update    Pt Name: Cam Amezquita  MRN: [id number]  YOB: 1950  Date of evaluation: 11/30/2020      I have examined the patient and reviewed the H&P/Consult and there are no changes to the patient or plans.         Electronically signed by Naima Benavides MD on 11/30/2020 at 12:14 PM

## 2020-11-30 NOTE — OP NOTE
Operative Note  Pre-Procedure Note    Patient Name: Gordy Jarrett   YOB: 1950  Medical Record Number: [id number]  Date: 11/30/20    Indication:  Acute Thoracic Compression Fracture  Consent: On file. Vital Signs:   Vitals:    11/30/20 1226   BP: (!) 158/78   Pulse: 77   Resp: 16   Temp: 94.1 °F (34.5 °C)   SpO2: 91%       Past Medical History:   has a past medical history of COPD (chronic obstructive pulmonary disease) (HCC) and Heartburn. Past Surgical History:   has a past surgical history that includes Cholecystectomy (2000); Knee arthroscopy; Tonsillectomy and adenoidectomy; and other surgical history (10/30/2012). Sedation/ Anesthesia Plan:   GENERAL    Patient is an appropriate candidate for planned procedure: yes      Preoperative Diagnosis:                                              1)) pathologic fractures at the levels of T-12                                             2) intractable pain       Postoperative Diagnosis:                                              1))  As above                                                Procedure Performed:  Vertebral bone biopsy with vertebral augmentation ( baloon Kyphoplasty) at the levels of T-12    Indication for the Procedure: Patient developed back pain and xray/ MRI/  CT scans were consistant with insufficiency fractures of T-12  The patient's pain is not responding well to conservative treatment including bed rest, activity modification and medications. Patient is in pain and not able to do PT well. As conservative measures failed, we decided to proceed with vertebral augmentation for the pain relief. The procedure and risks were discussed with patient and an informed consent was obtained. Procedure:   Patient was given 2 grams of Ancef IV prior to the procedure for antibiotic prophylaxis. Patient was given general endotracheal anesthesia and then was placed on the [facility] table with all pressure points well padded.  To facilitate the procedure biplanar fluoroscopy was used and by adjusting the angles of fluoroscopy both AP and lateral views of corresponding vertebral body was optimized. Skin over the back was prepped with antiseptic solution and the procedure was done under strict aspetic precautions. Skin and deep tissues up to the periosteum of pedicle was infilitrated with  -10ml of 1% XYLOCAINE with epinephrine. Then using #11 blade a small skin incission was made. The the working canula with trochar in place was inserted such that it lies over the lateral aspect of the pedicle. Then it was tapped slowly through the pedicle under constant fluoroscopic surveillance making sure that the medial border of the pedicle was not violated at any time. Then a bone biopsy canula was inserted through the canula and a bone biopsy was obtained. Bone was curetted as it was sclerotic. Then the Kyphon bone tamp was inserted through the working canula. This was done bipedicularly in similar fashion. Good placements were confirmed with fluoroscopy. The bone tamps  were then serially inflated, to reexpand the vertebral bodies, and restore more normal anatomy. In the meantime polymethylmethacrylate was mixed as per the protocol and Kyphon bone fillers were filled with it. Then they were immersed in warm water to obtain adequate consistency. The bone tamps were withdrawn, and bone cement was placed in both balloon cavities, using fluoroscopic guidance. The following are the volumes of contrast used to inflate the bone tamps and the volume of bone cement injected:         Level  T-12                right---  ---1.5 Ml of Polymethyl methacrylate                left---  ----4 Ml of Polymethyl methacrylate                         Once the cement had set and was seen to be in good position by fluoroscopy, the working canula and bone fillers were removed. Final hemostasis was secured by applying pressure.   The incisions were closed with 2-0 SILK

## 2020-11-30 NOTE — ANESTHESIA POSTPROCEDURE EVALUATION
Department of Anesthesiology  Postprocedure Note    Patient: Francie Maxwell  MRN: [id number]  YOB: 1950  Date of evaluation: 11/30/2020  Time:  6:33 PM     Procedure Summary     Date:  11/30/20 Room / Location:  [address] [place] [address] 03 / 138 [facility] July    Anesthesia Start:  4142 Anesthesia Stop:  7167    Procedure:  Kyphoplasty @ T12 and other levels needed (N/A Back) Diagnosis:  (Pathological Compression fracture)    Surgeon:  Lino Dinhr, MD Responsible Provider:  Diannah Fothergill, MD    Anesthesia Type:  general ASA Status:  3          Anesthesia Type: general    Sotero Phase I: Sotero Score: 10    Sotero Phase II: Sotero Score: 10    Last vitals: Reviewed and per EMR flowsheets. Anesthesia Post Evaluation    Patient location during evaluation: PACU  Patient participation: complete - patient participated  Level of consciousness: awake and alert  Airway patency: patent  Nausea & Vomiting: no nausea and no vomiting  Complications: no  Cardiovascular status: hemodynamically stable  Respiratory status: acceptable  Hydration status: euvolemic      [facility]  POST-ANESTHESIA NOTE       Name:  Francie Maxwell                                         Age:  69 y.o.   MRN:  [id number]      Last Vitals:  /63   Pulse 70   Temp 97.3 °F (36.3 °C) (Infrared)   Resp 14   Ht 6' 2\" (1.88 m)   Wt 220 lb (99.8 kg)   SpO2 97%   BMI 28.25 kg/m²   Patient Vitals for the past 4 hrs:   BP Temp Temp src Pulse Resp SpO2   11/30/20 1515 138/63 -- -- 70 14 97 %   11/30/20 1510 130/65 -- -- 70 15 96 %   11/30/20 1505 124/67 -- -- 72 18 95 %   11/30/20 1500 (!) 144/59 -- -- 74 14 96 %   11/30/20 1455 (!) 147/61 -- -- 78 15 93 %   11/30/20 1450 (!) 169/76 -- -- 75 14 96 %   11/30/20 1445 -- 97.3 °F (36.3 °C) Infrared 83 17 96 %       Level of Consciousness:  Awake    Respiratory:  Stable    Oxygen Saturation:  Stable    Cardiovascular:  Stable    Hydration:  Adequate    PONV: Stable    Post-op Pain:  Adequate analgesia    Post-op Assessment:  No apparent anesthetic complications    Additional Follow-Up / Treatment / Comment:  None    Khris Busch MD  November 30, 2020   6:33 PM

## 2020-11-30 NOTE — H&P
H&P    Patient is a pleasant 68 y/o male come with back pain. 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 States developed severe and stabbing pain. The problem occurs constantly. The problem has been rapidly worsening since onset. The pain is present in the lumbar spine and thoracic spine. The quality of the pain is described as stabbing and shooting. The pain does not radiate. The pain is at a severity of 9/10. The pain is severe. The pain is the same all the time. The symptoms are aggravated by bending, position, standing, sitting and coughing. Pertinent negatives include no abdominal pain, bladder incontinence, bowel incontinence, chest pain, fever, headaches, numbness or weakness. He has tried NSAIDs, muscle relaxant and analgesics (Brace) for the symptoms. The treatment provided no relief. MRI LUMBAR SPINE 10/20/2020:     FINDINGS:    There is an anterior wedge shape configuration of the T12 vertebral body with approximately 80% anterior height loss which has mildly worsened compared to prior radiographs dated 10/3/2020 and new compared to prior PET/CT. There is minimal, grade 1,    anterolisthesis of L3 relative to L4 on the basis of degenerative change, stable compared to prior exams. There is otherwise anatomic vertebral body height and alignment. There is edema throughout the T12 vertebral body. There is hyperintense T1 and T2    signal at the opposing endplates of O7-3 through L5-S1 with associated osteophytes as evidence for Modic 2 degenerative change. There are scattered focal areas of hyperintense T1 and T2 signal is evidence for hemangiomas. The conus terminates in a normal     fashion at the T12-L1 level. Paraspinal soft tissues are unremarkable. At T11-12 there is a shallow disc bulge without significant spinal canal or neuroforaminal stenosis.     At T12-L1 there is a shallow disc bulge without significant spinal canal or neuroforaminal stenosis. At L1-2 there is no significant spinal canal stenosis. There is mild left neural foraminal stenosis in association with facet hypertrophy and ligamentum flavum thickening. At L2-3 there is a minimal disc bulge without significant spinal canal stenosis. There is mild bilateral neural foraminal stenosis in association with facet hypertrophy and ligamentum flavum thickening. At L3-4 there is minimal partial uncovering the disc with superimposed shallow disc bulge which mildly indents the thecal sac and contributes to mild spinal canal stenosis and appears to contact traversing L4 nerve roots bilaterally. There is moderate    bilateral neural foraminal stenosis in association with facet hypertrophy and ligamentum flavum thickening. At L4-5 there is a shallow disc bulge without significant spinal canal stenosis and moderate left and mild-to-moderate right neuroforaminal stenosis in association with facet hypertrophy and ligamentum flavum thickening. At L5-S1 there is a shallow disc bulge without significant spinal canal stenosis and mild to moderate bilateral neural foraminal stenosis in association with facet hypertrophy and ligament flavum thickening.              Impression         1. Acute compression deformity of T12 which has mildly worsened in the interval since prior radiographs dated 10/3/2020 with approximately 80% anterior height loss at this level. 2. Multilevel degenerative changes of the lumbar spine most pronounced at L3-4 where there is mild spinal canal stenosis and moderate bilateral neural foraminal stenosis.               The patient is allergic to ace inhibitors.     PastMedical History  Nura Laguna  has a past medical history of COPD (chronic obstructive pulmonary disease) (HCC) and Heartburn.     Past Surgical History  The patient  has a past surgical history that includes Cholecystectomy (2000); Knee arthroscopy;  Tonsillectomy and adenoidectomy; and other surgical history (10/30/2012).    Family History  This patient's family history includes Arthritis in his mother; Cancer in his brother and father; Cataracts in his mother and sister.  Marv Sanchez  reports that he quit smoking about 19 years ago. He has a 31.00 pack-year smoking history. He has never used smokeless tobacco. He reports that he does not drink alcohol or use drugs.     Medications  Current Medication     Current Outpatient Medications:     niacin 250 MG extended release capsule, Take 250 mg by mouth nightly, Disp: , Rfl:     ADVAIR DISKUS 100-50 MCG/DOSE diskus inhaler, , Disp: , Rfl:     Red Yeast Rice Extract (RED YEAST RICE PO), Take  by mouth., Disp: , Rfl:     multivitamin (THERAGRAN) per tablet, Take 1 tablet by mouth daily. , Disp: , Rfl:     Cholecalciferol (VITAMIN D-3) 5000 UNITS TABS, Take 1 tablet by mouth daily. , Disp: , Rfl:     Omega-3 Fatty Acids (OMEGA 3 PO), Take 1,000 mg by mouth 2 times daily. , Disp: , Rfl:     Coenzyme Q10 (COQ10 PO), Take 200 mg by mouth daily. , Disp: , Rfl:     Esomeprazole Magnesium (NEXIUM PO), Take 40 mg by mouth 2 times daily. , Disp: , Rfl:         Subjective:      Review of Systems   Constitutional: Positive for activity change. Negative for appetite change, chills, diaphoresis, fatigue, fever and unexpected weight change. HENT: Positive for hearing loss. Negative for congestion, ear pain, mouth sores, nosebleeds, rhinorrhea, sinus pressure and sore throat. Eyes: Negative for photophobia, pain and visual disturbance. Respiratory: Negative for cough, chest tightness, shortness of breath and wheezing. Cardiovascular: Negative for chest pain and palpitations. Gastrointestinal: Negative for abdominal pain, bowel incontinence, constipation, diarrhea, nausea and vomiting. Endocrine: Negative for cold intolerance, heat intolerance, polydipsia, polyphagia and polyuria.    Genitourinary: Negative for bladder incontinence, Effort: Pulmonary effort is normal. No respiratory distress. Breath sounds: Normal breath sounds. No wheezing or rales. Chest:      Chest wall: No tenderness. Abdominal:      General: Bowel sounds are normal. There is no distension. Palpations: Abdomen is soft. Tenderness: There is no abdominal tenderness. There is no guarding or rebound. Musculoskeletal:      Right hip: He exhibits no tenderness. Left hip: He exhibits no tenderness. Right knee: He exhibits normal range of motion. No tenderness found. Left knee: He exhibits normal range of motion. No tenderness found. Right ankle: He exhibits normal range of motion and no swelling. Left ankle: He exhibits normal range of motion and no swelling. Cervical back: He exhibits normal range of motion and no tenderness. Thoracic back: He exhibits decreased range of motion, tenderness, pain and spasm. Lumbar back: He exhibits decreased range of motion, tenderness and pain. Right lower leg: He exhibits no swelling. Left lower leg: He exhibits no swelling. Right foot: No swelling. Left foot: No swelling. Skin:     General: Skin is warm. Coloration: Skin is not pale. Findings: No erythema or rash. Neurological:      Mental Status: He is alert and oriented to person, place, and time. He is not disoriented. Cranial Nerves: No cranial nerve deficit. Sensory: No sensory deficit. Motor: No atrophy or abnormal muscle tone. Coordination: Coordination normal.      Gait: Gait abnormal.      Deep Tendon Reflexes: Reflexes are normal and symmetric. Babinski sign absent on the right side. Babinski sign absent on the left side. Comments: SLR- NEGATIVE   Psychiatric:         Attention and Perception: Attention normal. He is attentive. Mood and Affect: Mood normal. Mood is not anxious or depressed. Affect is not labile, blunt, angry or inappropriate.          Speech: Speech normal. He is communicative. Speech is not rapid and pressured, delayed, slurred or tangential.         Behavior: Behavior normal. Behavior is not agitated, slowed, aggressive, withdrawn, hyperactive or combative. Behavior is cooperative. Thought Content: Thought content normal. Thought content is not paranoid or delusional. Thought content does not include homicidal or suicidal ideation. Thought content does not include homicidal or suicidal plan. Cognition and Memory: Cognition normal. Memory is not impaired. He does not exhibit impaired recent memory or impaired remote memory. Judgment: Judgment normal. Judgment is not impulsive or inappropriate.         NAM test: neg  Yeoman's test: neg  Gaenslen test: neg  Assessment:      1. Pathological compression fracture of thoracic vertebra, initial encounter ([facility] [address]    2. Spinal stenosis of lumbar region without neurogenic claudication       Plan:      · Patient read and signed orientation and opioid agreement. · OARRS reviewed. Current MED: 0  · Patient was not offered naloxone for home. · Discussed long term side effects of medications, tolerance, dependency and addiction. · UDS preformed today. · Patient told can not receive any pain medications from any other source. · No evidence of abuse, diversion or aberrant behavior. · Prescription Needs: No prescription pain medications at this time  · Medications and/or procedures to improve function and quality of life- patient understanding with this and that may not be pain free  · Discussed possible weaning of medication dosing dependent on treatment/procedure results. · Discussed with patient about safe storage of medications at home  · Testing: Reviewed MRI Lumbar Spine in detail with patient. Model used to discuss pathology. · Procedures: Patient symptoms have not improve after conservative treatment.  Recommend Kyphoplasty @ T-12 and other levels if needed,  · Discussed with patient about risks with procedure including infection, reaction to medication, increased pain, or bleeding. · Medications: Reviewed . · Ordered CBC, BMP and EKG  REVIEWED OK        Previous Treatments tried:  · PT: No,  Severe pain not able to tolerate. · NSAIDs: Yes,  any benefit? Yes,  how long taken: takes  · Chiropractic: No,  Not indicated  · Muscle relaxants: Yes,  any benefit? Yes  · Narcotics: No,  any benefit? No  · Spine surgeon consult: Yes  · Any Implants: No     Meds. Prescribed:     Encounter Medications    No orders of the defined types were placed in this encounter.        Return Kyphoplasty @ T12 and other levels if needed. Naomi Flores

## 2020-11-30 NOTE — PROGRESS NOTES
1445- Pt arrived to PACU, Melida Loera and Irena RN at bedside for report. Pt hooked up to monitor, VSS, pt breathing deeply on room air, SCD s hooked back up.  1452- Pt coughing and clearing his throat. 1455- Pt given some ice chips  1502- Dr Xavier Merlos at bedside talking to pt, pain meds sent to pharmacy, pt may remove dressing and shower in a few days. 1503- Pt given more ice chips  1505- PT complains of sore throat. 1510- Pt doing well given more ice chips. 1515- Pt meets discharge criteria from phase 1, VSS, pt breathing deeply on room air. 1516- Pt to phase 2, pt situated up in bed higher, dressing to back dry and intact. [address] [place] given  [address] at bedside. 1536- Pt given pain pill pain about a 4, back sore and throat sore. 1605- IV removed  1612- Pt given discharge instructions verbalized understanding, friend at bedside.   9510- PT getting dressed  446 1104- Pt discharged walked out to car with this RN

## 2020-12-08 ENCOUNTER — HOSPITAL ENCOUNTER (OUTPATIENT)
Dept: INTERVENTIONAL RADIOLOGY/VASCULAR | Age: 70
Discharge: HOME | End: 2020-12-08
Payer: MEDICARE

## 2020-12-08 PROCEDURE — 93971 EXTREMITY STUDY: CPT

## 2020-12-14 ENCOUNTER — OFFICE VISIT (OUTPATIENT)
Dept: PHYSICAL MEDICINE AND REHAB | Age: 70
End: 2020-12-14
Payer: MEDICARE

## 2020-12-14 VITALS
DIASTOLIC BLOOD PRESSURE: 70 MMHG | HEIGHT: 74 IN | BODY MASS INDEX: 28.23 KG/M2 | WEIGHT: 220 LBS | TEMPERATURE: 97.1 F | SYSTOLIC BLOOD PRESSURE: 138 MMHG

## 2020-12-14 PROCEDURE — 99214 OFFICE O/P EST MOD 30 MIN: CPT | Performed by: NURSE PRACTITIONER

## 2020-12-14 ASSESSMENT — ENCOUNTER SYMPTOMS
NAUSEA: 0
ABDOMINAL PAIN: 0
COLOR CHANGE: 0
WHEEZING: 0
RHINORRHEA: 0
CONSTIPATION: 0
PHOTOPHOBIA: 0
VOMITING: 0
BACK PAIN: 1
DIARRHEA: 0
COUGH: 0
SINUS PRESSURE: 0
EYE PAIN: 0
SHORTNESS OF BREATH: 0
CHEST TIGHTNESS: 0
SORE THROAT: 0

## 2020-12-14 NOTE — PROGRESS NOTES
[address] [facility]  [address]  Dept: [phone]  Dept Fax: [phone]: [phone]    Visit Date: 12/14/2020    Functionality Assessment/Goals Worksheet     On a scale of 0 (Does not Interfere) to 10 (Completely Interferes)     1. Which number describes how during the past week pain has interfered with       the following:  A. General Activity:  0  B. Mood: 0  C. Walking Ability:  0  D. Normal Work (Includes both work outside the home and housework):  0  E. Relations with Other People:   0  F. Sleep:   0  G. Enjoyment of Life:   0    2. Patient Prefers to Take their Pain Medications:     []  On a regular basis   []  Only when necessary    [x]  Does not take pain medications    3. What are the Patient's Goals/Expectations for Visiting Pain Management? []  Learn about my pain    []  Receive Medication   []  Physical Therapy     []  Treat Depression   []  Receive Injections    []  Treat Sleep   []  Deal with Anxiety and Stress   []  Treat Opoid Dependence/Addiction   []  Other:      HPI:   Luisa Eddy is a 69 y.o. male is here today for    Chief Complaint: Lower back pain     HPI   FU from Kyphoplasty @ T12 from 11/30/2020 and to remove sutures. Receiving about 99% relief of low back pain from Kyphoplasty and able to move much better and tolerate full activity. Intermittently after intense activity gets a mild muscle ache in right side lower back. Pain is minimal now since Kyphoplasty. Not needing any pain medication  Medications reviewed. Patient denies side effects with medications. Patient states he is taking medications as prescribed. Hedenies receiving pain medications from other sources. He denies any ER visits since last visit. Pain scale with out pain medications or at its worst is 0/10. The patientis allergic to ace inhibitors.     Past Medical History Rosalino Bell  has a past medical history of COPD (chronic obstructive pulmonary disease) (HCC) and Heartburn. Past Surgical History  The patient  has a past surgical history that includes Cholecystectomy (2000); Knee arthroscopy; Tonsillectomy and adenoidectomy; other surgical history (10/30/2012); and Spine surgery (N/A, 11/30/2020). Family History  This patient's family history includes Arthritis in his mother; Cancer in his brother and father; Cataracts in his mother and sister. Social History  Rosalino Bell  reports that he quit smoking about 19 years ago. He has a 31.00 pack-year smoking history. He has never used smokeless tobacco. He reports that he does not drink alcohol or use drugs. Medications    Current Outpatient Medications:     MELOXICAM PO, Take by mouth, Disp: , Rfl:     Budesonide-Formoterol Fumarate (SYMBICORT IN), Inhale into the lungs daily, Disp: , Rfl:     sucralfate (CARAFATE) 1 GM tablet, , Disp: , Rfl:     pantoprazole (PROTONIX) 40 MG tablet, , Disp: , Rfl:     niacin 250 MG extended release capsule, Take 250 mg by mouth nightly, Disp: , Rfl:     Red Yeast Rice Extract (RED YEAST RICE PO), Take  by mouth., Disp: , Rfl:     multivitamin (THERAGRAN) per tablet, Take 1 tablet by mouth daily. , Disp: , Rfl:     Cholecalciferol (VITAMIN D-3) 5000 UNITS TABS, Take 1 tablet by mouth daily. , Disp: , Rfl:     Coenzyme Q10 (COQ10 PO), Take 200 mg by mouth daily. , Disp: , Rfl:     Subjective:      Review of Systems   Constitutional: Negative for activity change, appetite change, chills, diaphoresis, fatigue, fever and unexpected weight change. HENT: Positive for hearing loss. Negative for congestion, ear pain, mouth sores, nosebleeds, rhinorrhea, sinus pressure and sore throat. Eyes: Negative for photophobia, pain and visual disturbance. Respiratory: Negative for cough, chest tightness, shortness of breath and wheezing. Cardiovascular: Negative for chest pain and palpitations. Musculoskeletal: Full passive range of motion without pain, normal range of motion and neck supple. Normal range of motion. No edema, erythema, neck rigidity or muscular tenderness. Thyroid: No thyromegaly. Cardiovascular:      Rate and Rhythm: Normal rate and regular rhythm. Heart sounds: Normal heart sounds. No murmur. No friction rub. No gallop. Pulmonary:      Effort: Pulmonary effort is normal. No respiratory distress. Breath sounds: Normal breath sounds. No wheezing or rales. Chest:      Chest wall: No tenderness. Abdominal:      General: Bowel sounds are normal. There is no distension. Palpations: Abdomen is soft. Tenderness: There is no abdominal tenderness. There is no guarding or rebound. Musculoskeletal:      Right hip: He exhibits no tenderness. Left hip: He exhibits no tenderness. Right knee: He exhibits normal range of motion. No tenderness found. Left knee: He exhibits normal range of motion. No tenderness found. Right ankle: He exhibits normal range of motion and no swelling. Left ankle: He exhibits normal range of motion and no swelling. Cervical back: He exhibits normal range of motion and no tenderness. Thoracic back: He exhibits normal range of motion, no tenderness, no pain, no spasm and normal pulse. Lumbar back: He exhibits normal range of motion, no tenderness and no pain. Back:       Right lower leg: He exhibits no swelling. Left lower leg: He exhibits no swelling. Right foot: No swelling. Left foot: No swelling. Skin:     General: Skin is warm. Coloration: Skin is not pale. Findings: No erythema or rash. Neurological:      General: No focal deficit present. Mental Status: He is alert and oriented to person, place, and time. He is not disoriented. Cranial Nerves: No cranial nerve deficit. Sensory: No sensory deficit. Motor: No atrophy or abnormal muscle tone. Coordination: Coordination normal.      Gait: Gait abnormal.      Deep Tendon Reflexes: Reflexes are normal and symmetric. Babinski sign absent on the right side. Babinski sign absent on the left side. Comments: SLR- NEGATIVE   Psychiatric:         Attention and Perception: Attention normal. He is attentive. Mood and Affect: Mood normal. Mood is not anxious or depressed. Affect is not labile, blunt, angry or inappropriate. Speech: Speech normal. He is communicative. Speech is not rapid and pressured, delayed, slurred or tangential.         Behavior: Behavior normal. Behavior is not agitated, slowed, aggressive, withdrawn, hyperactive or combative. Behavior is cooperative. Thought Content: Thought content normal. Thought content is not paranoid or delusional. Thought content does not include homicidal or suicidal ideation. Thought content does not include homicidal or suicidal plan. Cognition and Memory: Cognition normal. Memory is not impaired. He does not exhibit impaired recent memory or impaired remote memory. Judgment: Judgment normal. Judgment is not impulsive or inappropriate. NAM test: negative   Yeomans test: negative   Gaenslen test: negative      Assessment:     1. Pathological compression fracture of thoracic vertebra, initial encounter ([facility] [address]    2. Spinal stenosis of lumbar region without neurogenic claudication    3. S/P kyphoplasty            Plan:      · OARRS reviewed. Current MED: 0  · Patient was not offered naloxone for home. · Discussed long term side effects of medications, tolerance, dependency and addiction. · Previous UDS reviewed  · UDS preformed today for compliance. · Patient told can not receive any pain medications from any other source. · No evidence of abuse, diversion or aberrant behavior. ? Medications and/or procedures to improve function and quality of life- patient understanding with this and that may not be pain free  ? Discussed with patient about safe storage of medications at home  ? Discussed possible weaning of medication dosing dependent on treatment/procedure results. ? Discussed with patient about risks with procedure including infection, reaction to medication, increased pain, or bleeding. ? Procedure notes reviewed in detail. ? Receiving 99% relief of back pain since Kyphoplasty and able to tolerate full activity  ? No pain today   ? Tolerated suture removal  ? Follow as needed      Meds. Prescribed:   No orders of the defined types were placed in this encounter. Return if symptoms worsen or fail to improve. Time spent with patient was 25 minutes, more than 50% was spent Counseling/coordinated the patient'scare.       Electronically signed by Arthuro Bumpers, APRN - CNP on12/14/2020 at 9:12 AM

## 2021-05-04 ENCOUNTER — PROCEDURE VISIT (OUTPATIENT)
Dept: NEUROLOGY | Age: 71
End: 2021-05-04
Payer: MEDICARE

## 2021-05-04 DIAGNOSIS — M54.12 CERVICAL RADICULOPATHY: ICD-10-CM

## 2021-05-04 DIAGNOSIS — R20.0 BILATERAL HAND NUMBNESS: ICD-10-CM

## 2021-05-04 DIAGNOSIS — G56.03 BILATERAL CARPAL TUNNEL SYNDROME: Primary | ICD-10-CM

## 2021-05-04 PROCEDURE — 95886 MUSC TEST DONE W/N TEST COMP: CPT | Performed by: PSYCHIATRY & NEUROLOGY

## 2021-05-04 PROCEDURE — 95911 NRV CNDJ TEST 9-10 STUDIES: CPT | Performed by: PSYCHIATRY & NEUROLOGY

## 2021-05-05 ENCOUNTER — OFFICE VISIT (OUTPATIENT)
Dept: OPTOMETRY | Age: 71
End: 2021-05-05
Payer: COMMERCIAL

## 2021-05-05 DIAGNOSIS — H52.203 HYPEROPIA OF BOTH EYES WITH ASTIGMATISM AND PRESBYOPIA: Primary | ICD-10-CM

## 2021-05-05 DIAGNOSIS — H53.8 BLURRED VISION, BILATERAL: ICD-10-CM

## 2021-05-05 DIAGNOSIS — H52.03 HYPEROPIA OF BOTH EYES WITH ASTIGMATISM AND PRESBYOPIA: Primary | ICD-10-CM

## 2021-05-05 DIAGNOSIS — H52.4 HYPEROPIA OF BOTH EYES WITH ASTIGMATISM AND PRESBYOPIA: Primary | ICD-10-CM

## 2021-05-05 PROCEDURE — 92014 COMPRE OPH EXAM EST PT 1/>: CPT | Performed by: OPTOMETRIST

## 2021-05-05 ASSESSMENT — VISUAL ACUITY
OS_CC+: -1
CORRECTION_TYPE: GLASSES
OD_CC: 20/25 OU
METHOD: SNELLEN - LINEAR
OS_CC: 20/25

## 2021-05-05 ASSESSMENT — REFRACTION_MANIFEST
OD_ADD: +2.25
OS_AXIS: 096
OD_CYLINDER: -1.00
OS_CYLINDER: -0.75
OD_SPHERE: +2.00
OS_SPHERE: +2.00
OD_AXIS: 062
OS_ADD: +2.25

## 2021-05-05 ASSESSMENT — TONOMETRY
IOP_METHOD: NON-CONTACT AIR PUFF
OS_IOP_MMHG: 14
OD_IOP_MMHG: 16

## 2021-05-05 ASSESSMENT — ENCOUNTER SYMPTOMS
RESPIRATORY NEGATIVE: 0
GASTROINTESTINAL NEGATIVE: 0
EYES NEGATIVE: 0
ALLERGIC/IMMUNOLOGIC NEGATIVE: 0

## 2021-05-05 ASSESSMENT — REFRACTION_WEARINGRX
OD_SPHERE: +1.75
SPECS_TYPE: BIFOCAL
OS_SPHERE: +1.75
OS_ADD: +2.25
OD_CYLINDER: -0.50
OS_CYLINDER: -0.75
OS_AXIS: 098
OD_ADD: +2.25
OD_AXIS: 060

## 2021-05-05 ASSESSMENT — SLIT LAMP EXAM - LIDS
COMMENTS: NORMAL
COMMENTS: NORMAL

## 2021-05-05 NOTE — PROGRESS NOTES
Carine Player presents today for   Chief Complaint   Patient presents with    Blurred Vision    Vision Exam   .    HPI     Blurred Vision     In both eyes. Vision is blurred. Context:  distance vision and reading. Comments     Last Vision Exam: 3/11/2019 Aw  Last Ophthalmology Exam: n.a  Last Filled Glasses Rx: 3/11/2019  Insurance: Amber Gut  Update: Glasses  Distance and reading are getting more blurry                 Current Outpatient Medications   Medication Sig Dispense Refill    Cetirizine HCl (ZYRTEC ALLERGY PO) Take by mouth      MELOXICAM PO Take by mouth      Budesonide-Formoterol Fumarate (SYMBICORT IN) Inhale into the lungs daily      sucralfate (CARAFATE) 1 GM tablet       pantoprazole (PROTONIX) 40 MG tablet       niacin 250 MG extended release capsule Take 250 mg by mouth nightly      Red Yeast Rice Extract (RED YEAST RICE PO) Take  by mouth.  multivitamin (THERAGRAN) per tablet Take 1 tablet by mouth daily.  Cholecalciferol (VITAMIN D-3) 5000 UNITS TABS Take 1 tablet by mouth daily.  Coenzyme Q10 (COQ10 PO) Take 200 mg by mouth daily. No current facility-administered medications for this visit.           Family History   Problem Relation Age of Onset   Starkey Cancer Father         prostate    Arthritis Mother     Cataracts Mother    Starkey Cancer Brother         leukemia    Cataracts Sister     Glaucoma Neg Hx     Diabetes Neg Hx      Social History     Socioeconomic History    Marital status: Single     Spouse name: None    Number of children: None    Years of education: None    Highest education level: None   Occupational History     Employer: [employer]   Social [employer]    Financial resource strain: None    Food insecurity     Worry: None     Inability: None    Transportation needs     Medical: None     Non-medical: None   Tobacco Use    Smoking status: Former Smoker     Packs/day: 1.00     Years: 31.00     Pack years: 31.00     Quit date: 8/31/2001 Years since quittin.6    Smokeless tobacco: Never Used   Substance and Sexual Activity    Alcohol use: No    Drug use: No    Sexual activity: None   Lifestyle    Physical activity     Days per week: None     Minutes per session: None    Stress: None   Relationships    Social connections     Talks on phone: None     Gets together: None     Attends Orthodoxy service: None     Active member of club or organization: None     Attends meetings of clubs or organizations: None     Relationship status: None    Intimate partner violence     Fear of current or ex partner: None     Emotionally abused: None     Physically abused: None     Forced sexual activity: None   Other Topics Concern    None   Social History Narrative    None     Past Medical History:   Diagnosis Date    Asthma     COPD (chronic obstructive pulmonary disease) ([facility])     Heartburn     Hyperlipidemia     Hypertension     Renal lithiasis        ROS     Negative for: Constitutional, Gastrointestinal, Neurological, Skin, Genitourinary, Musculoskeletal, HENT, Endocrine, Cardiovascular, Eyes, Respiratory, Psychiatric, Allergic/Imm, Heme/Lymph          Main Ophthalmology Exam     External Exam       Right Left    External Normal Normal          Slit Lamp Exam       Right Left    Lids/Lashes Normal Normal    Conjunctiva/Sclera White and quiet White and quiet    Cornea Clear Clear    Anterior Chamber Deep and quiet Deep and quiet    Iris Round and reactive Round and reactive    Lens Trace Nuclear sclerosis-1 Trace Nuclear sclerosis-1    Vitreous Normal Normal          Fundus Exam       Right Left    Disc Normal Normal    C/D Ratio 0.2 0.2    Macula Normal Normal    Vessels Normal Normal                   Tonometry     Tonometry (Non-contact air puff, 10:57 AM)       Right Left    Pressure 16 14   IOP.8             12.8  CH:  8.9          10.1  WS: 7.7          6.2                  Not recorded         Not recorded          Visual Acuity (Snellen - Linear)       Right Left    Dist cc 20/25 20/25 -1    Near cc 20/25 OU     Correction: Glasses          Pupils     Pupils       Pupils    Right PERRL    Left PERRL              Neuro/Psych     Neuro/Psych     Oriented x3: Yes    Mood/Affect: Normal               Not recorded            Ophthalmology Exam     Wearing Rx       Sphere Cylinder Axis Add    Right +1.75 -0.50 060 +2.25    Left +1.75 -0.75 098 +2.25    Age: 2yrs    Type: Bifocal              Manifest Refraction     Manifest Refraction       Sphere Cylinder [place] Add Near [place]    Right +2.00 -1.00 062 20/20- +2.25     Left +2.00 -0.75 096 20/20- +2.25 20/20ou          Manifest Refraction #2 (Auto)       Sphere Cylinder [place] Dist [place] Add Near [place]    Right +2.50 -1.00 077       Left +2.50 -1.00 078                  Final Rx       Sphere Cylinder Axis Add    Right +2.00 -1.00 062 +2.25    Left +2.00 -0.75 096 +2.25    Type: Bifocal    Expiration Date: 5/6/2023            IMPRESSION:  1. Hyperopia of both eyes with astigmatism and presbyopia    2. Blurred vision, bilateral        PLAN:    1. New glasses recommended  2.  \"       Patient Instructions   New glasses recommended      Return in about 2 years (around 5/5/2023) for complete eye exam.

## 2021-09-27 PROBLEM — E78.5 HYPERLIPIDEMIA: Status: ACTIVE | Noted: 2021-09-27

## 2021-09-27 PROBLEM — K44.9 HIATAL HERNIA: Status: ACTIVE | Noted: 2021-09-27

## 2021-09-27 PROBLEM — N20.0 RENAL LITHIASIS: Status: ACTIVE | Noted: 2021-09-27

## 2021-09-27 PROBLEM — M17.0 ARTHRITIS OF BOTH KNEES: Status: ACTIVE | Noted: 2021-09-27

## 2021-09-27 PROBLEM — S22.080A WEDGE COMPRESSION FRACTURE OF T11-T12 VERTEBRA, INITIAL ENCOUNTER FOR CLOSED FRACTURE (HCC): Status: ACTIVE | Noted: 2021-09-27

## 2021-09-27 PROBLEM — M51.37 DEGENERATION OF LUMBOSACRAL INTERVERTEBRAL DISC: Status: ACTIVE | Noted: 2021-09-27

## 2021-09-27 PROBLEM — M51.379 DEGENERATION OF LUMBOSACRAL INTERVERTEBRAL DISC: Status: ACTIVE | Noted: 2021-09-27

## 2021-09-27 PROBLEM — M48.061 SPINAL STENOSIS OF LUMBAR REGION: Status: ACTIVE | Noted: 2021-09-27

## 2021-09-27 PROBLEM — J45.909 UNCOMPLICATED ASTHMA: Status: ACTIVE | Noted: 2021-09-27

## 2021-09-27 PROBLEM — M19.011 OSTEOARTHRITIS OF RIGHT GLENOHUMERAL JOINT: Status: ACTIVE | Noted: 2021-09-27

## 2021-09-27 PROBLEM — M43.10 ACQUIRED SPONDYLOLISTHESIS: Status: ACTIVE | Noted: 2021-09-27

## 2021-09-27 PROBLEM — I10 HYPERTENSION: Status: ACTIVE | Noted: 2021-09-27

## 2021-09-27 PROBLEM — R20.2 PARESTHESIA: Status: ACTIVE | Noted: 2021-09-27

## 2021-09-27 PROBLEM — M19.049 LOCALIZED, PRIMARY OSTEOARTHRITIS OF HAND: Status: ACTIVE | Noted: 2021-09-27

## 2021-09-27 PROBLEM — S32.010A WEDGE COMPRESSION FRACTURE OF FIRST LUMBAR VERTEBRA, INITIAL ENCOUNTER FOR CLOSED FRACTURE (HCC): Status: ACTIVE | Noted: 2021-09-27

## 2021-09-29 ENCOUNTER — HOSPITAL ENCOUNTER (OUTPATIENT)
Dept: ULTRASOUND IMAGING | Age: 71
Discharge: HOME | End: 2021-09-29
Payer: MEDICARE

## 2021-09-29 DIAGNOSIS — Z13.6 SCREENING FOR AAA (ABDOMINAL AORTIC ANEURYSM): ICD-10-CM

## 2021-09-29 PROCEDURE — 76706 US ABDL AORTA SCREEN AAA: CPT

## 2022-05-20 ENCOUNTER — OFFICE VISIT (OUTPATIENT)
Dept: INTERNAL MEDICINE CLINIC | Age: 72
End: 2022-05-20
Payer: MEDICARE

## 2022-05-20 VITALS
HEART RATE: 84 BPM | RESPIRATION RATE: 93 BRPM | DIASTOLIC BLOOD PRESSURE: 82 MMHG | WEIGHT: 221.3 LBS | SYSTOLIC BLOOD PRESSURE: 132 MMHG | BODY MASS INDEX: 29.6 KG/M2 | TEMPERATURE: 98.2 F

## 2022-05-20 DIAGNOSIS — M25.561 CHRONIC PAIN OF RIGHT KNEE: Primary | ICD-10-CM

## 2022-05-20 DIAGNOSIS — Z87.891 EX-SMOKER FOR MORE THAN 1 YEAR: ICD-10-CM

## 2022-05-20 DIAGNOSIS — J42 CHRONIC BRONCHITIS, UNSPECIFIED CHRONIC BRONCHITIS TYPE (HCC): ICD-10-CM

## 2022-05-20 DIAGNOSIS — G89.29 CHRONIC PAIN OF RIGHT KNEE: Primary | ICD-10-CM

## 2022-05-20 DIAGNOSIS — Z01.818 PRE-OP EVALUATION: ICD-10-CM

## 2022-05-20 PROBLEM — J44.9 COPD (CHRONIC OBSTRUCTIVE PULMONARY DISEASE) (HCC): Status: ACTIVE | Noted: 2022-05-20

## 2022-05-20 PROCEDURE — 99214 OFFICE O/P EST MOD 30 MIN: CPT | Performed by: INTERNAL MEDICINE

## 2022-05-20 RX ORDER — FAMOTIDINE 40 MG/1
TABLET, FILM COATED ORAL
COMMUNITY
Start: 2022-02-28

## 2022-05-20 SDOH — ECONOMIC STABILITY: FOOD INSECURITY: WITHIN THE PAST 12 MONTHS, YOU WORRIED THAT YOUR FOOD WOULD RUN OUT BEFORE YOU GOT MONEY TO BUY MORE.: NEVER TRUE

## 2022-05-20 SDOH — ECONOMIC STABILITY: FOOD INSECURITY: WITHIN THE PAST 12 MONTHS, THE FOOD YOU BOUGHT JUST DIDN'T LAST AND YOU DIDN'T HAVE MONEY TO GET MORE.: NEVER TRUE

## 2022-05-20 ASSESSMENT — SOCIAL DETERMINANTS OF HEALTH (SDOH): HOW HARD IS IT FOR YOU TO PAY FOR THE VERY BASICS LIKE FOOD, HOUSING, MEDICAL CARE, AND HEATING?: NOT HARD AT ALL

## 2022-05-20 NOTE — PROGRESS NOTES
[address] [place] [address] Internal Medicine   [address] OFFISA[address]G RAZ.JOHN, Ray[facility]  Dept: [phone]  Dept Fax: [address]  YOB: 1950  Age:  67 y.o. Date of Service:  5/20/2022    Vitals:    05/20/22 1050   BP: 132/82   Site: Left Upper Arm   Pulse: 84   Resp: (!) 93   Temp: 98.2 °F (36.8 °C)   Weight: 221 lb 4.8 oz (100.4 kg)      Wt Readings from Last 2 Encounters:   05/20/22 221 lb 4.8 oz (100.4 kg)   09/27/21 222 lb (100.7 kg)     BP Readings from Last 3 Encounters:   05/20/22 132/82   09/27/21 (!) 142/78   04/19/21 (!) 158/84        Chief Complaint   Patient presents with   Jade Acuna Pre-op Exam     RTK     Allergies   Allergen Reactions    Ace Inhibitors      Pt unsure of reaction     Outpatient Medications Marked as Taking for the 5/20/22 encounter (Office Visit) with Rocky Walker MD   Medication Sig Dispense Refill    famotidine (PEPCID) 40 MG tablet       levocetirizine (XYZAL) 5 MG tablet Take 5 mg by mouth nightly      SYMBICORT 160-4.5 MCG/ACT AERO USE 1 TO 2 INHALATIONS TWICE A DAY AND RINSE MOUTH OUT AFTER USE 30.6 g 1    sucralfate (CARAFATE) 1 GM tablet          This patient presents to the office today for a preoperative consultation at the request of surgeon, Dr. Katlin Lazo who plans on performing Rt TKR on 6/8/22 at [address] As the knee pain is getting   Worse, and conservative Rx failed . Surgery planned at [facility],  Denies any CP or SOB, non smoker at this time , quit 20 yrs ago. He is retired from [employer]. No recent fever or chills, no COVID related issues, had 3 shots so far. No major hospitalizations, recently. Denies CAD, previous echo was normal. Never had   Stress or cardiac cath. He is active for his age, 66 yo male not in distress. He does have good functional capacity , > 4 METS w/o difficulty. He does have hx  Of COPD, not on home oxygen.         Planned anesthesia: General   Known anesthesia problems: None   Bleeding risk: No recent or remote history of abnormal bleeding  Personal or FH of DVT/PE: No    Patient objection to receiving blood products: No    Patient Active Problem List   Diagnosis    Deviated nasal septum    Hypertrophy of nasal turbinates    Neoplasm of uncertain behavior of trachea, bronchus, and lung    Hyperopia of both eyes with astigmatism and presbyopia    Hyperopia of both eyes with astigmatism    Compression fracture of T12 vertebra (HCC)    Intractable pain    Acquired spondylolisthesis    Arthritis of both knees    Osteoarthritis of right glenohumeral joint    Degeneration of lumbosacral intervertebral disc    Localized, primary osteoarthritis of hand    Paresthesia    Spinal stenosis of lumbar region    Wedge compression fracture of first lumbar vertebra, initial encounter for closed fracture ([address]    Wedge compression fracture of T11-T12 vertebra, initial encounter for closed fracture ([address]    Renal lithiasis    Uncomplicated asthma    Hiatal hernia    Hypertension    Hyperlipidemia    Chronic pain of right knee    COPD (chronic obstructive pulmonary disease) ([address]    Ex-smoker for more than 1 year       Past Medical History:   Diagnosis Date    Asthma     COPD (chronic obstructive pulmonary disease) ([address]     Heartburn     Hyperlipidemia     Hypertension     Renal lithiasis      Past Surgical History:   Procedure Laterality Date    CHOLECYSTECTOMY  2000    KNEE ARTHROSCOPY      Left knee    OTHER SURGICAL HISTORY  10/30/2012    RIGHT VATS RLL WEDGE RESECTION    SPINE SURGERY N/A 11/30/2020    Kyphoplasty @ T12 and other levels needed performed by Tripp Smith [facility] at [facility]       Family History   Problem Relation Age of Onset   Starkey Cancer Father         prostate    Arthritis Mother     Cataracts Mother     Cancer Brother         leukemia    Cataracts Sister     Glaucoma Neg Hx     Diabetes Neg Hx      Social History     Socioeconomic History  Marital status: Single     Spouse name: Not on file    Number of children: Not on file    Years of education: Not on file    Highest education level: Not on file   Occupational History     Employer: [employer]   Tobacco Use    Smoking status: Former Smoker     Packs/day: 1.00     Years: 31.00     Pack years: 31.00     Quit date: 2001     Years since quittin.7    Smokeless tobacco: Never Used   Vaping Use    Vaping Use: Never used   Substance and Sexual Activity    Alcohol use: No    Drug use: No    Sexual activity: Not on file   Other Topics Concern    Not on file   Social History Narrative    Not on file     Social Determinants of Health     Financial Resource Strain: Low Risk     Difficulty of Paying Living Expenses: Not hard at all   Food Insecurity: No Food Insecurity    Worried About [address] in the Last Year: Never true    [facility] in the Last Year: Never true   Transportation Needs:     Lack of Transportation (Medical): Not on file    Lack of Transportation (Non-Medical):  Not on file   Physical Activity:     Days of Exercise per Week: Not on file    Minutes of Exercise per Session: Not on file   Stress:     Feeling of Stress : Not on file   Social Connections:     Frequency of Communication with Friends and Family: Not on file    Frequency of Social Gatherings with Friends and Family: Not on file    Attends Sabianist Services: Not on file    Active Member of [address] [place] [address] or Organizations: Not on file    Attends Club or Organization Meetings: Not on file    Marital Status: Not on file   Intimate Partner Violence:     Fear of Current or Ex-Partner: Not on file    Emotionally Abused: Not on file    Physically Abused: Not on file    Sexually Abused: Not on file   Housing Stability:     Unable to Pay for Housing in the Last Year: Not on file    Number of [place] in the Last Year: Not on file    Unstable Housing in the Last Year: Not on file Review of Systems     As per HPI      Physical Exam     Constitutional: He is oriented to person, place, and time. He appears well-developed and well-nourished. No distress. HENT:   Head: Normocephalic and atraumatic. Mouth/Throat: Uvula is midline, oropharynx is clear and moist and mucous membranes are normal.   Eyes: Conjunctivae and EOM are normal. Pupils are equal, round, and reactive to light. Neck: Trachea normal and normal range of motion. Neck supple. No JVD present. Carotid bruit is not present. No mass and no thyromegaly present. Cardiovascular: Normal rate, regular rhythm, normal heart sounds and intact distal pulses. Exam reveals no gallop and no friction rub. No murmur heard. Pulmonary/Chest: Effort normal and breath sounds normal. No respiratory distress. He has no wheezes. He has no rales. Abdominal: Soft. Normal aorta and bowel sounds are normal. He exhibits no distension and no mass. There is no hepatosplenomegaly. No tenderness. Musculoskeletal: He exhibits no edema and no tenderness. Neurological: He is alert and oriented to person, place, and time. He has normal strength. No cranial nerve deficit or sensory deficit. Coordination and gait normal.   Skin: Skin is warm and dry. No rash noted. No erythema. Psychiatric: He has a normal mood and affect. His behavior is normal.     EKG Interpretation, and CXR noted from [facility]    Noted the labs they are normal,      Assessment:       67 y.o. patient with planned surgery as above. Known risk factors for perioperative complications: COPD  Current medications which may produce withdrawal symptoms if withheld perioperatively: none      Diagnosis Orders   1. Chronic pain of right knee     2. Chronic bronchitis, unspecified chronic bronchitis type ([address]     3. Ex-smoker for more than 1 year     4. Pre-op evaluation              Plan:     1. Preoperative workup as follows: none  2.  Change in medication regimen before surgery: Hold all medications on morning of surgery. He denies taking any   NSAID's   3. Prophylaxis for cardiac events with perioperative beta-blockers: Not indicated  ACC/AHA indications for pre-operative beta-blocker use:    · Vascular surgery with history of postitive stress test  · Intermediate or high risk surgery with history of CAD   · Intermediate or high risk surgery with multiple clinical predictors of CAD- 2 of the following: history of compensated or prior heart failure, history of cerebrovascular disease, DM, or renal insufficiency    Routine administration of higher-dose, long-acting metoprolol in beta-blocker-naïve patients on the day of surgery, and in the absence of dose titration is associated with an overall increase in mortality. Beta-blockers should be started days to weeks prior to surgery and titrated to pulse < 70.  4. Deep vein thrombosis prophylaxis: regimen to be chosen by surgical team  5. No contraindications to planned surgery, low to moderate risk due to COPD. Thank you for asking me to evaluate Gabo Gabovu , please  Call with any questions.

## 2022-06-23 ENCOUNTER — HOSPITAL ENCOUNTER (OUTPATIENT)
Dept: INTERVENTIONAL RADIOLOGY/VASCULAR | Age: 72
Discharge: HOME | End: 2022-06-23
Payer: MEDICARE

## 2022-06-23 DIAGNOSIS — M79.661 PAIN OF RIGHT LOWER LEG: ICD-10-CM

## 2022-06-23 DIAGNOSIS — M79.89 LEG SWELLING: ICD-10-CM

## 2022-06-23 PROCEDURE — 93971 EXTREMITY STUDY: CPT

## 2022-10-10 ENCOUNTER — OFFICE VISIT (OUTPATIENT)
Dept: CARDIOLOGY CLINIC | Age: 72
End: 2022-10-10
Payer: MEDICARE

## 2022-10-10 VITALS
HEART RATE: 80 BPM | BODY MASS INDEX: 28.06 KG/M2 | HEIGHT: 74 IN | SYSTOLIC BLOOD PRESSURE: 182 MMHG | WEIGHT: 218.6 LBS | DIASTOLIC BLOOD PRESSURE: 88 MMHG

## 2022-10-10 DIAGNOSIS — I10 HYPERTENSION, UNSPECIFIED TYPE: Primary | ICD-10-CM

## 2022-10-10 DIAGNOSIS — R94.31 ABNORMAL EKG: ICD-10-CM

## 2022-10-10 PROCEDURE — 93000 ELECTROCARDIOGRAM COMPLETE: CPT | Performed by: INTERNAL MEDICINE

## 2022-10-10 PROCEDURE — 1123F ACP DISCUSS/DSCN MKR DOCD: CPT | Performed by: INTERNAL MEDICINE

## 2022-10-10 PROCEDURE — 99204 OFFICE O/P NEW MOD 45 MIN: CPT | Performed by: INTERNAL MEDICINE

## 2022-10-10 NOTE — PROGRESS NOTES
[address] [facility] [address] [facility] [address]  Dept: [phone]  Dept Fax: [phone]  Loc: [phone]    Visit Date: 10/10/2022    Mr. Chu Chavez is a 67 y.o. male  who presented for:  Chief Complaint   Patient presents with    New Patient     Abnormal CT scan       HPI:   66 yo M c hx COPD, HTN, HLD is referred for abnormal calcium score. He reports shortness of breath but overall very active. He thinks he has issues with COPD. EKG is SR, incomplete RBBB. He had total calcium score of 445. Current Outpatient Medications:     budesonide-formoterol (SYMBICORT) 160-4.5 MCG/ACT AERO, USE 1 TO 2 INHALATIONS TWICE A DAY AND RINSE MOUTH OUT AFTER USE, Disp: 30.6 g, Rfl: 3    nystatin-triamcinolone (MYCOLOG II) 401577-1.1 UNIT/GM-% cream, Apply topically 2 times daily. , Disp: 60 g, Rfl: 1    famotidine (PEPCID) 40 MG tablet, , Disp: , Rfl:     levocetirizine (XYZAL) 5 MG tablet, Take 5 mg by mouth nightly, Disp: , Rfl:     sucralfate (CARAFATE) 1 GM tablet, , Disp: , Rfl:     Past Medical History  Gallito Barkley  has a past medical history of Asthma, COPD (chronic obstructive pulmonary disease) ([address] Heartburn, Hyperlipidemia, Hypertension, and Renal lithiasis. Social History  Gallito Barkley  reports that he quit smoking about 21 years ago. His smoking use included cigarettes. He has a 31.00 pack-year smoking history. He has never used smokeless tobacco. He reports that he does not drink alcohol and does not use drugs. Family History  Gallito Barkley family history includes Arthritis in his mother; Cancer in his brother and father; Cataracts in his mother and sister.     Past Surgical History   Past Surgical History:   Procedure Laterality Date    CHOLECYSTECTOMY  01/01/2000    JOINT REPLACEMENT Right 06/08/2022    OIO    JOINT REPLACEMENT Right 06/2022    KNEE ARTHROSCOPY      Left knee    OTHER SURGICAL HISTORY  10/30/2012    RIGHT VATS RLL WEDGE RESECTION    SPINE SURGERY N/A 11/30/2020    Kyphoplasty @ T12 and other levels needed performed by Jacqueline Zelaya MD at [address]         Subjective:     REVIEW OF SYSTEMS  Constitutional: denies sweats, chills and fever  HENT: denies  congestion, sinus pressure, sneezing and sore throat. Eyes: denies  pain, discharge, redness and itching. Respiratory: denies apnea, cough  Gastrointestinal: denies blood in stool, constipation, diarrhea   Endocrine: denies cold intolerance, heat intolerance, polydipsia. Genitourinary: denies dysuria, enuresis, flank pain and hematuria. Musculoskeletal: denies arthralgias, joint swelling and neck pain. Neurological: denies numbness and headaches. Psychiatric/Behavioral: denies agitation, confusion, decreased concentration and dysphoric mood    All others reviewed and are negative. Objective:     BP (!) 182/88   Pulse 80   Ht 6' 2\" (1.88 m)   Wt 218 lb 9.6 oz (99.2 kg)   BMI 28.07 kg/m²     Wt Readings from Last 3 Encounters:   10/10/22 218 lb 9.6 oz (99.2 kg)   09/28/22 221 lb (100.2 kg)   05/20/22 221 lb 4.8 oz (100.4 kg)     BP Readings from Last 3 Encounters:   10/10/22 (!) 182/88   09/28/22 138/84   05/20/22 132/82       PHYSICAL EXAM  Constitutional: Oriented to person, place, and time. Appears well-developed and well-nourished. HENT:   Head: Normocephalic and atraumatic. Eyes: EOM are normal. Pupils are equal, round, and reactive to light. Neck: Normal range of motion. Neck supple. No JVD present. Cardiovascular: Normal rate , normal heart sounds and intact distal pulses. Pulmonary/Chest: Effort normal and breath sounds normal. No respiratory distress. No wheezes. No rales. Abdominal: Soft. Bowel sounds are normal. No distension. There is no tenderness. Musculoskeletal: Normal range of motion. No edema. Neurological: Alert and oriented to person, place, and time. No cranial nerve deficit. Coordination normal.   Skin: Skin is warm and dry. Psychiatric: Normal mood and affect.        No results found for: CKTOTAL, CKMB, CKMBINDEX    Lab Results   Component Value Date/Time    WBC 6.5 09/28/2022 11:17 AM    WBC 6.5 09/24/2015 08:31 AM    RBC 5.77 09/28/2022 11:17 AM    HGB 17.7 09/28/2022 11:17 AM    HCT 51.8 09/28/2022 11:17 AM    MCV 89.8 09/28/2022 11:17 AM    MCH 30.7 09/28/2022 11:17 AM    MCHC 34.2 09/28/2022 11:17 AM    RDW 12.4 09/28/2022 11:17 AM     09/28/2022 11:17 AM     09/24/2015 08:31 AM    MPV 9.4 09/28/2022 11:17 AM       Lab Results   Component Value Date/Time     09/28/2022 11:17 AM    K 4.3 09/28/2022 11:17 AM     09/28/2022 11:17 AM    CO2 26 09/28/2022 11:17 AM    BUN 17 09/28/2022 11:17 AM    LABALBU 4.8 09/28/2022 11:17 AM    LABALBU 4.3 09/16/2011 04:40 PM    CREATININE 0.85 09/28/2022 11:17 AM    CALCIUM 9.4 09/28/2022 11:17 AM    LABGLOM >90 09/24/2015 08:31 AM    GLUCOSE 92 09/28/2022 11:17 AM       Lab Results   Component Value Date/Time    ALKPHOS 105 09/28/2022 11:17 AM    ALKPHOS 79 09/24/2015 08:31 AM    ALT 20 09/28/2022 11:17 AM    AST 22 09/28/2022 11:17 AM    PROT 6.9 09/28/2022 11:17 AM    BILITOT 0.2 09/28/2022 11:17 AM    BILIDIR <0.2 09/28/2022 11:17 AM    LABALBU 4.8 09/28/2022 11:17 AM    LABALBU 4.3 09/16/2011 04:40 PM       No results found for: MG    No results found for: INR, PROTIME      No results found for: LABA1C    Lab Results   Component Value Date/Time    TRIG 145 09/28/2022 11:17 AM    HDL 49 09/28/2022 11:17 AM    LDLCALC 164 09/28/2022 11:17 AM    LABVLDL 29 09/28/2022 11:17 AM       Lab Results   Component Value Date/Time    TSH 2.320 09/22/2014 07:45 AM         Testing Reviewed:      I haveindividually reviewed the below cardiac tests    EKG:    ECHO: Results for orders placed during the hospital encounter of 08/20/18    Echocardiogram 2D/ M-Mode/ Colorflow/ Do    Narrative  Transthoracic Echocardiography Report (TTE)    Demographics    Patient Name    Sascha Scott Gender                Male    MR #            [id number]     Race                      Ethnicity    Account #       [de-identified]     Room Number    Accession       [id number]     Date of Study         08/20/2018  Number    Date of Birth   1950    Referring Vidhya Mclean MD    Age             70 year(s)    Zaria Lilly, [facility]    Interpreting          Echo reader of the  Physician             week  Say Kimble MD    Procedure    Type of Study    TTE procedure:ECHOCARDIOGRAM COMPLETE 2D W DOPPLER W COLOR. Procedure Date  Date: 08/20/2018 Start: 08:02 AM    Study Location: Echo Lab  Technical Quality: Limited visualization due to lung interface. Indications:Cardiomegaly. Additional Medical History: Former smoker, COPD    Patient Status: Routine    Height: 74 inches Weight: 225 pounds BSA: 2.28 m^2 BMI: 28.89 kg/m^2    BP: 166/82 mmHg    Conclusions    Summary  Normal left ventricle size and systolic function. Ejection fraction was  estimated at 55 %. There were no regional left ventricular wall motion  abnormalities and wall thickness was within normal limits. Signature    ----------------------------------------------------------------  Electronically signed by Say Kimble MD (Interpreting  physician) on 08/20/2018 at 07:24 PM  ----------------------------------------------------------------    Findings    Mitral Valve  The mitral valve structure was normal with normal leaflet separation. DOPPLER: The transmitral velocity was within the normal range with no  evidence for mitral stenosis. Trace mitral regurgitation is present. Aortic Valve  The aortic valve was trileaflet with normal thickness and cuspal  separation. DOPPLER: Transaortic velocity was within the normal range with  no evidence of aortic stenosis. There was no evidence of aortic  regurgitation.     Tricuspid Valve  The tricuspid valve structure was normal with normal leaflet separation. DOPPLER: There was no evidence of tricuspid stenosis. Mild tricuspid regurgitation visualized. Pulmonic Valve  The pulmonic valve leaflets exhibited normal thickness, no calcification,  and normal cuspal separation. DOPPLER: The transpulmonic velocity was  within the normal range with no evidence for regurgitation. Left Atrium  Left atrial size was normal.    Left Ventricle  Normal left ventricle size and systolic function. Ejection fraction was  estimated at 55 %. There were no regional left ventricular wall motion  abnormalities and wall thickness was within normal limits. Right Atrium  Right atrial size was normal.    Right Ventricle  The right ventricular size was normal with normal systolic function and  wall thickness. Pericardial Effusion  The pericardium was normal in appearance with no evidence of a pericardial  effusion. Pleural Effusion  No evidence of pleural effusion. Aorta / Great Vessels  -Aortic root dimension within normal limits.  -The Pulmonary artery is within normal limits. -IVC size is within normal limits with normal respiratory phasic changes.     M-Mode/2D Measurements & Calculations    LV Diastolic    LV Systolic Dimension: 3.7   AV Cusp Separation: 2 cmLA  Dimension: 5.1  cm                           Dimension: 3.1 cmAO Root  cm              LV Volume Diastolic: 012 ml  Dimension: 3.7 cm  LV FS:27.5 %    LV Volume Systolic: 82.8 ml  LV PW           LV EDV/LV EDV Index: 086  Diastolic: 0.9  OX/24 R^3RL ESV/LV ESV  cm              Index: 58.1 ml/25 m^2        RV Diastolic Dimension: 3.3  Septum          EF Calculated: 53.2 %        cm  Diastolic: 0.8  cm                                           LA/Aorta: 0.84    Doppler Measurements & Calculations    MV Peak E-Wave: 64.5 cm/s  AV Peak Velocity: 116  LVOT Peak Velocity: 98.5  MV Peak A-Wave: 69.4 cm/s  cm/s                   cm/s  MV E/A Ratio: 0.93         AV Peak Gradient: 5.38 LVOT Peak Gradient: 4  MV Peak Gradient: 1.66     mmHg                   mmHg  mmHg    MV Deceleration Time: 373  msec    PV Peak Velocity: 73.2  MV E' Septal Velocity: 7.2                        cm/s  cm/s                       AV DVI (Vmax):0.85     PV Peak Gradient: 2.14  MV A' Septal Velocity:                            mmHg  12.5 cm/s  MV E' Lateral Velocity:  8.4 cm/s  MV A' Lateral Velocity:  11.7 cm/s  E/E' septal: 8.96  E/E' lateral: 7.68    [id number]      STRESS:    CATH:    Assessment/Plan       Diagnosis Orders   1. Hypertension, unspecified type  EKG 12 Lead        Dyspnea   Abnormal calcium score  Incomplete RBBB  HTN  HLD  Former smoker    Reviewed EKG, CT-calcium score  Has knee issues   Reports some shortness of breath  Has COPD  Will get Lexiscan stress test  The patient is asked to make an attempt to improve diet and exercise patterns to aid in medical management of this problem. Advised more plant based nutrition/meditarrean diet   Advised patient to call office or seek immediate medical attention if there is any new onset of  any chest pain, sob, palpitations, lightheadedness, dizziness, orthopnea, PND or pedal edema. All medication side effects were discussed in details. Thank youfor allowing me to participate in the care of this patient. Please do not hesitate to contact me for any further questions. Return in about 6 months (around 4/10/2023), or if symptoms worsen or fail to improve, for Review testing, Regular follow up.        Electronically signed by Roberto Chavez MD [address]  10/10/2022 at 1:53 PM EDT

## 2022-10-17 ENCOUNTER — HOSPITAL ENCOUNTER (OUTPATIENT)
Dept: NON INVASIVE DIAGNOSTICS | Age: 72
Discharge: HOME | End: 2022-10-17
Payer: MEDICARE

## 2022-10-17 VITALS — BODY MASS INDEX: 27.98 KG/M2 | WEIGHT: 218 LBS | HEIGHT: 74 IN

## 2022-10-17 DIAGNOSIS — I10 HYPERTENSION, UNSPECIFIED TYPE: ICD-10-CM

## 2022-10-17 DIAGNOSIS — R94.31 ABNORMAL EKG: ICD-10-CM

## 2022-10-17 PROCEDURE — A9500 TC99M SESTAMIBI: HCPCS | Performed by: INTERNAL MEDICINE

## 2022-10-17 PROCEDURE — 6360000002 HC RX W HCPCS

## 2022-10-17 PROCEDURE — 3430000000 HC RX DIAGNOSTIC RADIOPHARMACEUTICAL: Performed by: INTERNAL MEDICINE

## 2022-10-17 PROCEDURE — 93017 CV STRESS TEST TRACING ONLY: CPT | Performed by: INTERNAL MEDICINE

## 2022-10-17 PROCEDURE — 78452 HT MUSCLE IMAGE SPECT MULT: CPT

## 2022-10-17 RX ADMIN — Medication 34.4 MILLICURIE: at 09:21

## 2022-10-17 RX ADMIN — Medication 10.3 MILLICURIE: at 08:17

## 2023-04-24 ENCOUNTER — OFFICE VISIT (OUTPATIENT)
Dept: CARDIOLOGY CLINIC | Age: 73
End: 2023-04-24
Payer: MEDICARE

## 2023-04-24 VITALS
DIASTOLIC BLOOD PRESSURE: 89 MMHG | HEART RATE: 84 BPM | WEIGHT: 230 LBS | BODY MASS INDEX: 29.52 KG/M2 | SYSTOLIC BLOOD PRESSURE: 154 MMHG | HEIGHT: 74 IN

## 2023-04-24 DIAGNOSIS — R06.00 DYSPNEA, UNSPECIFIED TYPE: Primary | ICD-10-CM

## 2023-04-24 PROCEDURE — 99214 OFFICE O/P EST MOD 30 MIN: CPT | Performed by: INTERNAL MEDICINE

## 2023-04-24 PROCEDURE — G8417 CALC BMI ABV UP PARAM F/U: HCPCS | Performed by: INTERNAL MEDICINE

## 2023-04-24 PROCEDURE — 3079F DIAST BP 80-89 MM HG: CPT | Performed by: INTERNAL MEDICINE

## 2023-04-24 PROCEDURE — G8427 DOCREV CUR MEDS BY ELIG CLIN: HCPCS | Performed by: INTERNAL MEDICINE

## 2023-04-24 PROCEDURE — 3077F SYST BP >= 140 MM HG: CPT | Performed by: INTERNAL MEDICINE

## 2023-04-24 PROCEDURE — 1036F TOBACCO NON-USER: CPT | Performed by: INTERNAL MEDICINE

## 2023-04-24 PROCEDURE — 3017F COLORECTAL CA SCREEN DOC REV: CPT | Performed by: INTERNAL MEDICINE

## 2023-04-24 PROCEDURE — 1123F ACP DISCUSS/DSCN MKR DOCD: CPT | Performed by: INTERNAL MEDICINE

## 2023-04-24 NOTE — PROGRESS NOTES
Pt C/O when lifting heavy stuff he has chest pain on both side, going up the neck and back.       Pt denies  SOB, Headache, dizziness, heart palpitations, swelling, fatigue

## 2023-04-24 NOTE — PROGRESS NOTES
[address] [facility] [address] [place] [address]  Dept: [phone]  Dept Fax: [phone]  Loc: [phone]    Visit Date: 4/24/2023    Mr. Tina Hopper is a 75 y.o. male  who presented for:  Chief Complaint   Patient presents with    Follow-up     6 month        HPI:   76 yo M c hx COPD, HTN, HLD is initially referred for abnormal calcium score. He reports shortness of breath but overall very active. He thinks he has issues with COPD. EKG is SR, incomplete RBBB. He had total calcium score of 445. He underwent stress test and is here for a follow up. Denies any chest pain, sob, palpitations, lightheadedness, dizziness, orthopnea, PND or pedal edema. Current Outpatient Medications:     budesonide-formoterol (SYMBICORT) 160-4.5 MCG/ACT AERO, USE 1 TO 2 INHALATIONS TWICE A DAY AND RINSE MOUTH OUT AFTER USE, Disp: 30.6 g, Rfl: 3    nystatin-triamcinolone (MYCOLOG II) 320225-8.1 UNIT/GM-% cream, Apply topically 2 times daily. , Disp: 60 g, Rfl: 1    famotidine (PEPCID) 40 MG tablet, , Disp: , Rfl:     sucralfate (CARAFATE) 1 GM tablet, , Disp: , Rfl:     Past Medical History  Kim Prater  has a past medical history of Asthma, COPD (chronic obstructive pulmonary disease) ([facility] [address] Heartburn, Hyperlipidemia, Hypertension, and Renal lithiasis. Social History  Kim Prater  reports that he quit smoking about 21 years ago. His smoking use included cigarettes. He has a 31.00 pack-year smoking history. He has never used smokeless tobacco. He reports that he does not drink alcohol and does not use drugs. Family History  Kim Prater family history includes Arthritis in his mother; Cancer in his brother and father; Cataracts in his mother and sister.     Past Surgical History   Past Surgical History:   Procedure Laterality Date    CHOLECYSTECTOMY  01/01/2000    JOINT REPLACEMENT Right 06/08/2022    OIO    JOINT REPLACEMENT Right 06/2022    KNEE ARTHROSCOPY

## 2023-05-23 ENCOUNTER — HOSPITAL ENCOUNTER (OUTPATIENT)
Age: 73
Discharge: HOME | End: 2023-05-23
Payer: MEDICARE

## 2023-05-23 ENCOUNTER — HOSPITAL ENCOUNTER (OUTPATIENT)
Dept: GENERAL RADIOLOGY | Age: 73
Discharge: HOME | End: 2023-05-23
Payer: MEDICARE

## 2023-05-23 DIAGNOSIS — M25.511 RIGHT SHOULDER PAIN, UNSPECIFIED CHRONICITY: ICD-10-CM

## 2023-05-23 PROCEDURE — 73030 X-RAY EXAM OF SHOULDER: CPT

## 2023-10-10 ENCOUNTER — HOSPITAL ENCOUNTER (OUTPATIENT)
Dept: ULTRASOUND IMAGING | Age: 73
Discharge: HOME | End: 2023-10-10

## 2023-10-10 DIAGNOSIS — Z13.6 ENCOUNTER FOR SCREENING FOR VASCULAR DISEASE: ICD-10-CM

## 2023-10-10 PROCEDURE — 9900000021 US VASCULAR SCREENING

## 2023-12-14 ENCOUNTER — HOSPITAL ENCOUNTER (OUTPATIENT)
Dept: CT IMAGING | Age: 73
Discharge: HOME | End: 2023-12-14
Payer: MEDICARE

## 2023-12-14 DIAGNOSIS — Z87.442 HISTORY OF KIDNEY STONES: ICD-10-CM

## 2023-12-14 DIAGNOSIS — R10.9 RIGHT FLANK PAIN: ICD-10-CM

## 2023-12-14 PROCEDURE — 74176 CT ABD & PELVIS W/O CONTRAST: CPT

## 2024-06-13 NOTE — PROGRESS NOTES
[facility]  [address]  [place] [address]  Phone:  [phone]          Name: Chandler Rodríguez  : 1950         Chief Complaint:     Chief Complaint   Patient presents with    Arm Pain     Sore on arm that has been there for a couple weeks        History of Present Illness:     Chandler Rodríguez is a 75 yo male who presents today to establish as a new patient for evaluation of a sore on his left upper arm.  It's been there a few weeks.  He's been hunting in [facility] so thinks it may be an insect bite.  It's been red, itches, and has peeled.  He also has a skin tag on is left scalp he'd like frozen.  He sees Dr. Stinson once a year.    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 Earl last year that helped.  He is right-handed and sometimes it's hard to reach for objects as his shoulder pinches.    His father and grandfather  from prostate cancer so he gets his PSA checked routinely.    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  He quit smoking over 20 years ago.    His former PCP was Dr. Moore.      Past Medical History:     Past Medical History:   Diagnosis Date    Asthma     COPD (chronic obstructive pulmonary disease) (HCC)     Heartburn     Hyperlipidemia     Renal lithiasis         Past Surgical History:     Past Surgical History:   Procedure Laterality Date    CHOLECYSTECTOMY  2000    JOINT REPLACEMENT Right 2022    OIO    JOINT REPLACEMENT Right 2022    KNEE ARTHROSCOPY      Left knee    OTHER SURGICAL HISTORY  10/30/2012    RIGHT VATS RLL WEDGE RESECTION    SPINE SURGERY N/A 2020    Kyphoplasty @ T12 and other levels needed performed by Andrew Oropeza MD at [facility]    TONSILLECTOMY AND ADENOIDECTOMY          Family History:     Family History   Problem Relation Age of Onset    Cancer Father         prostate

## 2024-06-17 ENCOUNTER — OFFICE VISIT (OUTPATIENT)
Dept: FAMILY MEDICINE CLINIC | Age: 74
End: 2024-06-17
Payer: MEDICARE

## 2024-06-17 VITALS
HEIGHT: 74 IN | SYSTOLIC BLOOD PRESSURE: 132 MMHG | OXYGEN SATURATION: 98 % | WEIGHT: 222 LBS | BODY MASS INDEX: 28.49 KG/M2 | DIASTOLIC BLOOD PRESSURE: 78 MMHG | HEART RATE: 88 BPM | TEMPERATURE: 98.4 F | RESPIRATION RATE: 16 BRPM

## 2024-06-17 DIAGNOSIS — S40.862A INSECT BITE OF LEFT UPPER ARM, INITIAL ENCOUNTER: Primary | ICD-10-CM

## 2024-06-17 DIAGNOSIS — M25.511 CHRONIC RIGHT SHOULDER PAIN: ICD-10-CM
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
## 2024-06-17 DIAGNOSIS — G89.29 CHRONIC RIGHT SHOULDER PAIN: ICD-10-CM

## 2024-06-17 PROCEDURE — G8417 CALC BMI ABV UP PARAM F/U: HCPCS | Performed by: FAMILY MEDICINE

## 2024-06-17 PROCEDURE — 20610 DRAIN/INJ JOINT/BURSA W/O US: CPT | Performed by: FAMILY MEDICINE

## 2024-06-17 PROCEDURE — 1036F TOBACCO NON-USER: CPT | Performed by: FAMILY MEDICINE

## 2024-06-17 PROCEDURE — 3017F COLORECTAL CA SCREEN DOC REV: CPT | Performed by: FAMILY MEDICINE

## 2024-06-17 PROCEDURE — G8427 DOCREV CUR MEDS BY ELIG CLIN: HCPCS | Performed by: FAMILY MEDICINE

## 2024-06-17 PROCEDURE — 1123F ACP DISCUSS/DSCN MKR DOCD: CPT | Performed by: FAMILY MEDICINE

## 2024-06-17 PROCEDURE — 99203 OFFICE O/P NEW LOW 30 MIN: CPT | Performed by: FAMILY MEDICINE

## 2024-06-17 PROCEDURE — 3078F DIAST BP <80 MM HG: CPT | Performed by: FAMILY MEDICINE

## 2024-06-17 PROCEDURE — 3075F SYST BP GE 130 - 139MM HG: CPT | Performed by: FAMILY MEDICINE

## 2024-06-17 RX ORDER — BUPIVACAINE HYDROCHLORIDE 5 MG/ML
30 INJECTION, SOLUTION EPIDURAL; INTRACAUDAL ONCE
Status: COMPLETED | OUTPATIENT
Start: 2024-06-17 | End: 2024-06-17

## 2024-06-17 RX ORDER — LIDOCAINE HYDROCHLORIDE 10 MG/ML
4 INJECTION, SOLUTION EPIDURAL; INFILTRATION; INTRACAUDAL; PERINEURAL ONCE
Status: SHIPPED | OUTPATIENT
Start: 2024-06-17

## 2024-06-17 RX ORDER — TRIAMCINOLONE ACETONIDE 1 MG/G
CREAM TOPICAL
Qty: 30 G | Refills: 0 | Status: SHIPPED | OUTPATIENT
Start: 2024-06-17 | End: 2024-06-17

## 2024-06-17 RX ORDER — LIDOCAINE HYDROCHLORIDE 10 MG/ML
5 INJECTION, SOLUTION INFILTRATION; PERINEURAL ONCE
Status: COMPLETED | OUTPATIENT
Start: 2024-06-17 | End: 2024-06-17

## 2024-06-17 RX ORDER — TRIAMCINOLONE ACETONIDE 40 MG/ML
40 INJECTION, SUSPENSION INTRA-ARTICULAR; INTRAMUSCULAR ONCE
Status: COMPLETED | OUTPATIENT
Start: 2024-06-17 | End: 2024-06-17

## 2024-06-17 RX ADMIN — LIDOCAINE HYDROCHLORIDE 5 ML: 10 INJECTION, SOLUTION INFILTRATION; PERINEURAL at 11:23

## 2024-06-17 RX ADMIN — TRIAMCINOLONE ACETONIDE 40 MG: 40 INJECTION, SUSPENSION INTRA-ARTICULAR; INTRAMUSCULAR at 11:19

## 2024-06-17 RX ADMIN — BUPIVACAINE HYDROCHLORIDE 150 MG: 5 INJECTION, SOLUTION EPIDURAL; INTRACAUDAL at 11:20

## 2024-06-17 SDOH — ECONOMIC STABILITY: FOOD INSECURITY: WITHIN THE PAST 12 MONTHS, THE FOOD YOU BOUGHT JUST DIDN'T LAST AND YOU DIDN'T HAVE MONEY TO GET MORE.: NEVER TRUE

## 2024-06-17 SDOH — ECONOMIC STABILITY: FOOD INSECURITY: WITHIN THE PAST 12 MONTHS, YOU WORRIED THAT YOUR FOOD WOULD RUN OUT BEFORE YOU GOT MONEY TO BUY MORE.: NEVER TRUE

## 2024-06-17 SDOH — ECONOMIC STABILITY: HOUSING INSECURITY
IN THE LAST 12 MONTHS, WAS THERE A TIME WHEN YOU DID NOT HAVE A STEADY PLACE TO SLEEP OR SLEPT IN A SHELTER (INCLUDING NOW)?: NO

## 2024-06-17 SDOH — ECONOMIC STABILITY: INCOME INSECURITY: HOW HARD IS IT FOR YOU TO PAY FOR THE VERY BASICS LIKE FOOD, HOUSING, MEDICAL CARE, AND HEATING?: NOT HARD AT ALL

## 2024-06-17 ASSESSMENT — PATIENT HEALTH QUESTIONNAIRE - PHQ9
SUM OF ALL RESPONSES TO PHQ QUESTIONS 1-9: 0
2. FEELING DOWN, DEPRESSED OR HOPELESS: NOT AT ALL
SUM OF ALL RESPONSES TO PHQ QUESTIONS 1-9: 0
1. LITTLE INTEREST OR PLEASURE IN DOING THINGS: NOT AT ALL
SUM OF ALL RESPONSES TO PHQ9 QUESTIONS 1 & 2: 0

## 2024-06-17 ASSESSMENT — PAIN DESCRIPTION - LOCATION: LOCATION: SHOULDER

## 2024-06-17 ASSESSMENT — PAIN DESCRIPTION - DESCRIPTORS: DESCRIPTORS: DISCOMFORT

## 2024-06-17 ASSESSMENT — PAIN SCALES - GENERAL: PAINLEVEL_OUTOF10: 6

## 2024-06-17 ASSESSMENT — PAIN DESCRIPTION - ORIENTATION: ORIENTATION: RIGHT

## 2024-06-19 RX ORDER — TRIAMCINOLONE ACETONIDE 1 MG/G
CREAM TOPICAL
Qty: 30 G | Refills: 0 | Status: SHIPPED | OUTPATIENT
Start: 2024-06-19

## 2024-10-03 NOTE — PROGRESS NOTES
[facility]  [address]  [place] [address]  Phone:  [phone]     Medicare Annual Wellness Visit    Chandler Rodríguez is here for Medicare AWV    Assessment & Plan   Encounter for Medicare annual wellness exam  -     Routine healthcare maintenance was reviewed as below.  -     CBC with Auto Differential; Future  -     Comprehensive Metabolic Panel; Future    Hyperlipidemia, unspecified hyperlipidemia type  -     Will check routine labs.  A healthy diet and routine physical activity encouraged.  -     Lipid Panel; Future    Chronic pain of both shoulders        -    His shoulders were injected today and he tolerated it well.  He was advised on aftercare instructions.    Recommendations for Preventive Services Due: see orders and patient instructions/AVS.  Recommended screening schedule for the next 5-10 years is provided to the patient in written form: see Patient Instructions/AVS.     Return in about 1 year (around 10/8/2025) for Medicare AWV.       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  He has pain in bilateral shoulders that flares with certain movements.  He'd like them injected today.  He's also following with Dr. Earl for his knee pain.    Lab Results   Component Value Date     10/06/2023    K 4.6 10/06/2023     10/06/2023    CO2 25 10/06/2023    BUN 15 10/06/2023    CREATININE 0.83 10/06/2023    GLUCOSE 97 10/06/2023    CALCIUM 9.0 10/06/2023    BILITOT 0.6 10/06/2023    ALKPHOS 89 10/06/2023    AST 26 10/06/2023    ALT 28 10/06/2023    LABGLOM >90 09/24/2015     Lab Results   Component Value Date    CHOL 226 (H) 10/06/2023    TRIG 204 (H) 10/06/2023    HDL 43 10/06/2023     Lab Results   Component Value Date    ALT 28 10/06/2023    AST 26 10/06/2023          Patient's complete Health Risk Assessment and screening values have been reviewed and are found in Flowsheets. The following problems were reviewed today and where indicated follow up appointments were made and/or

## 2024-10-08 ENCOUNTER — OFFICE VISIT (OUTPATIENT)
Dept: FAMILY MEDICINE CLINIC | Age: 74
End: 2024-10-08

## 2024-10-08 ENCOUNTER — LAB (OUTPATIENT)
Dept: LAB | Age: 74
End: 2024-10-08

## 2024-10-08 ENCOUNTER — TELEPHONE (OUTPATIENT)
Dept: FAMILY MEDICINE CLINIC | Age: 74
End: 2024-10-08

## 2024-10-08 VITALS
RESPIRATION RATE: 16 BRPM | WEIGHT: 222.66 LBS | TEMPERATURE: 97.9 F | SYSTOLIC BLOOD PRESSURE: 134 MMHG | HEIGHT: 74 IN | BODY MASS INDEX: 28.58 KG/M2 | DIASTOLIC BLOOD PRESSURE: 76 MMHG | OXYGEN SATURATION: 90 % | HEART RATE: 66 BPM

## 2024-10-08 DIAGNOSIS — E78.5 HYPERLIPIDEMIA, UNSPECIFIED HYPERLIPIDEMIA TYPE: ICD-10-CM

## 2024-10-08 DIAGNOSIS — G89.29 CHRONIC PAIN OF BOTH SHOULDERS: Primary | ICD-10-CM

## 2024-10-08 DIAGNOSIS — M25.512 CHRONIC PAIN OF BOTH SHOULDERS: Primary | ICD-10-CM

## 2024-10-08 DIAGNOSIS — Z00.00 ENCOUNTER FOR MEDICARE ANNUAL WELLNESS EXAM: ICD-10-CM

## 2024-10-08 DIAGNOSIS — M25.511 CHRONIC PAIN OF BOTH SHOULDERS: Primary | ICD-10-CM

## 2024-10-08 DIAGNOSIS — Z23 NEED FOR INFLUENZA VACCINATION: ICD-10-CM

## 2024-10-08 LAB
ALBUMIN SERPL BCG-MCNC: 4.3 G/DL (ref 3.5–5.1)
ALP SERPL-CCNC: 87 U/L (ref 38–126)
ALT SERPL W/O P-5'-P-CCNC: 20 U/L (ref 11–66)
ANION GAP SERPL CALC-SCNC: 12 MEQ/L (ref 8–16)
AST SERPL-CCNC: 25 U/L (ref 5–40)
BASOPHILS ABSOLUTE: 0.1 THOU/MM3 (ref 0–0.1)
BASOPHILS NFR BLD AUTO: 1.3 %
BILIRUB SERPL-MCNC: 0.6 MG/DL (ref 0.3–1.2)
BUN SERPL-MCNC: 18 MG/DL (ref 7–22)
CALCIUM SERPL-MCNC: 9 MG/DL (ref 8.5–10.5)
CHLORIDE SERPL-SCNC: 105 MEQ/L (ref 98–111)
CHOLEST SERPL-MCNC: 196 MG/DL (ref 100–199)
CO2 SERPL-SCNC: 23 MEQ/L (ref 23–33)
CREAT SERPL-MCNC: 0.8 MG/DL (ref 0.4–1.2)
DEPRECATED RDW RBC AUTO: 41.8 FL (ref 35–45)
EOSINOPHIL NFR BLD AUTO: 6.6 %
EOSINOPHILS ABSOLUTE: 0.3 THOU/MM3 (ref 0–0.4)
ERYTHROCYTE [DISTWIDTH] IN BLOOD BY AUTOMATED COUNT: 12 % (ref 11.5–14.5)
GFR SERPL CREATININE-BSD FRML MDRD: > 90 ML/MIN/1.73M2
GLUCOSE SERPL-MCNC: 101 MG/DL (ref 70–108)
HCT VFR BLD AUTO: 50.8 % (ref 42–52)
HDLC SERPL-MCNC: 39 MG/DL
HGB BLD-MCNC: 17.2 GM/DL (ref 14–18)
IMM GRANULOCYTES # BLD AUTO: 0.01 THOU/MM3 (ref 0–0.07)
IMM GRANULOCYTES NFR BLD AUTO: 0.2 %
LDLC SERPL CALC-MCNC: 131 MG/DL
LYMPHOCYTES ABSOLUTE: 1.6 THOU/MM3 (ref 1–4.8)
LYMPHOCYTES NFR BLD AUTO: 30 %
MCH RBC QN AUTO: 31.9 PG (ref 26–33)
MCHC RBC AUTO-ENTMCNC: 33.9 GM/DL (ref 32.2–35.5)
MCV RBC AUTO: 94.2 FL (ref 80–94)
MONOCYTES ABSOLUTE: 0.6 THOU/MM3 (ref 0.4–1.3)
MONOCYTES NFR BLD AUTO: 10.7 %
NEUTROPHILS ABSOLUTE: 2.7 THOU/MM3 (ref 1.8–7.7)
NEUTROPHILS NFR BLD AUTO: 51.2 %
NRBC BLD AUTO-RTO: 0 /100 WBC
PLATELET # BLD AUTO: 274 THOU/MM3 (ref 130–400)
PMV BLD AUTO: 9.5 FL (ref 9.4–12.4)
POTASSIUM SERPL-SCNC: 4.5 MEQ/L (ref 3.5–5.2)
PROT SERPL-MCNC: 7.1 G/DL (ref 6.1–8)
RBC # BLD AUTO: 5.39 MILL/MM3 (ref 4.7–6.1)
SODIUM SERPL-SCNC: 140 MEQ/L (ref 135–145)
TRIGL SERPL-MCNC: 131 MG/DL (ref 0–199)
WBC # BLD AUTO: 5.3 THOU/MM3 (ref 4.8–10.8)

## 2024-10-08 RX ORDER — BUPIVACAINE HYDROCHLORIDE 5 MG/ML
2 INJECTION, SOLUTION PERINEURAL ONCE
Status: COMPLETED | OUTPATIENT
Start: 2024-10-08 | End: 2024-10-08

## 2024-10-08 RX ORDER — LIDOCAINE HYDROCHLORIDE 10 MG/ML
4 INJECTION, SOLUTION EPIDURAL; INFILTRATION; INTRACAUDAL; PERINEURAL ONCE
Status: COMPLETED | OUTPATIENT
Start: 2024-10-08 | End: 2024-10-08

## 2024-10-08 RX ORDER — TRIAMCINOLONE ACETONIDE 40 MG/ML
40 INJECTION, SUSPENSION INTRA-ARTICULAR; INTRAMUSCULAR ONCE
Status: COMPLETED | OUTPATIENT
Start: 2024-10-08 | End: 2024-10-08

## 2024-10-08 RX ADMIN — TRIAMCINOLONE ACETONIDE 40 MG: 40 INJECTION, SUSPENSION INTRA-ARTICULAR; INTRAMUSCULAR at 11:56

## 2024-10-08 RX ADMIN — TRIAMCINOLONE ACETONIDE 40 MG: 40 INJECTION, SUSPENSION INTRA-ARTICULAR; INTRAMUSCULAR at 11:55

## 2024-10-08 RX ADMIN — BUPIVACAINE HYDROCHLORIDE 50 MG: 5 INJECTION, SOLUTION PERINEURAL at 11:51

## 2024-10-08 RX ADMIN — LIDOCAINE HYDROCHLORIDE 4 ML: 10 INJECTION, SOLUTION EPIDURAL; INFILTRATION; INTRACAUDAL; PERINEURAL at 11:54

## 2024-10-08 RX ADMIN — LIDOCAINE HYDROCHLORIDE 4 ML: 10 INJECTION, SOLUTION EPIDURAL; INFILTRATION; INTRACAUDAL; PERINEURAL at 11:53

## 2024-10-08 RX ADMIN — BUPIVACAINE HYDROCHLORIDE 10 MG: 5 INJECTION, SOLUTION PERINEURAL at 11:43

## 2024-10-08 ASSESSMENT — PATIENT HEALTH QUESTIONNAIRE - PHQ9
2. FEELING DOWN, DEPRESSED OR HOPELESS: NOT AT ALL
SUM OF ALL RESPONSES TO PHQ QUESTIONS 1-9: 0
SUM OF ALL RESPONSES TO PHQ9 QUESTIONS 1 & 2: 0
SUM OF ALL RESPONSES TO PHQ QUESTIONS 1-9: 0
1. LITTLE INTEREST OR PLEASURE IN DOING THINGS: NOT AT ALL

## 2024-10-08 ASSESSMENT — LIFESTYLE VARIABLES
HOW OFTEN DO YOU HAVE A DRINK CONTAINING ALCOHOL: 2-4 TIMES A MONTH
HOW MANY STANDARD DRINKS CONTAINING ALCOHOL DO YOU HAVE ON A TYPICAL DAY: 1 OR 2

## 2024-10-08 NOTE — TELEPHONE ENCOUNTER
Pt seen a supplement called omega XL advertised on tv. He has a lot of joint pain and stated that he seen on tv that is supposed to help with joint pain. He wanted to know if that would be something he would be able to start taking?

## 2024-10-08 NOTE — PATIENT INSTRUCTIONS
aspirin. Wait for an ambulance. Do not try to drive yourself.  Watch closely for changes in your health, and be sure to contact your doctor if you have any problems.  Where can you learn more?  Go to https://www.SCL.net/patientEd and enter F075 to learn more about \"A Healthy Heart: Care Instructions.\"  Current as of: June 24, 2023  Content Version: 14.2  © 2024 [employer].   Care instructions adapted under license by [employer]. If you have questions about a medical condition or this instruction, always ask your healthcare professional. [employer] disclaims any warranty or liability for your use of this information.      Personalized Preventive Plan for Chandler Rodríguez - 10/8/2024  Medicare offers a range of preventive health benefits. Some of the tests and screenings are paid in full while other may be subject to a deductible, co-insurance, and/or copay.    Some of these benefits include a comprehensive review of your medical history including lifestyle, illnesses that may run in your family, and various assessments and screenings as appropriate.    After reviewing your medical record and screening and assessments performed today your provider may have ordered immunizations, labs, imaging, and/or referrals for you.  A list of these orders (if applicable) as well as your Preventive Care list are included within your After Visit Summary for your review.    Other Preventive Recommendations:    A preventive eye exam performed by an eye specialist is recommended every 1-2 years to screen for glaucoma; cataracts, macular degeneration, and other eye disorders.  A preventive dental visit is recommended every 6 months.  Try to get at least 150 minutes of exercise per week or 10,000 steps per day on a pedometer .  Order or download the FREE \"Exercise & Physical Activity: Your Everyday Guide\" from [employer]. Call 1-243.759.8298 or search [employer]

## 2024-10-08 NOTE — PROGRESS NOTES
Immunizations Administered       Name Date Dose Route    Influenza, FLUAD, (age 65 y+), IM, Trivalent PF, 0.5mL 10/8/2024 0.5 mL Intramuscular    Site: Deltoid- Left    Lot: [id number]    NDC: 84579-939-61            VIS GIVEN.  CONSENT SIGNED  PATIENT TOLERATED WELL.     Vaccine Information Sheet, \"Influenza - Inactivated\"  given to Chandler Rodríguez, or parent/legal guardian of  Chandler Rodríguez and verbalized understanding.    Patient responses:    Have you ever had a reaction to a flu vaccine? No  Do you have an allergy to eggs, neomycin or polymixin?  No  Do you have an allergy to Thimerosal, contact lens solution, or Merthiolate? No  Have you ever had Guillian Stark Syndrome?  No  Do you have any current illness?  No  Do you have a temperature above 100 degrees? No  Are you pregnant? No  If pregnant, permission obtained from physician? No  Do you have an active neurological disorder? No      Flu vaccine given per order. Please see immunization tab.

## 2025-01-15 DIAGNOSIS — J45.909 UNCOMPLICATED ASTHMA, UNSPECIFIED ASTHMA SEVERITY, UNSPECIFIED WHETHER PERSISTENT: ICD-10-CM

## 2025-01-15 RX ORDER — BUDESONIDE AND FORMOTEROL FUMARATE DIHYDRATE 160; 4.5 UG/1; UG/1
AEROSOL RESPIRATORY (INHALATION)
Qty: 30.6 G | Refills: 3 | Status: SHIPPED | OUTPATIENT
Start: 2025-01-15 | End: 2025-01-16

## 2025-01-16 DIAGNOSIS — J45.909 UNCOMPLICATED ASTHMA, UNSPECIFIED ASTHMA SEVERITY, UNSPECIFIED WHETHER PERSISTENT: ICD-10-CM

## 2025-01-16 RX ORDER — BUDESONIDE AND FORMOTEROL FUMARATE DIHYDRATE 160; 4.5 UG/1; UG/1
AEROSOL RESPIRATORY (INHALATION)
Qty: 30.6 G | Refills: 1 | Status: SHIPPED | OUTPATIENT
Start: 2025-01-16

## 2025-01-16 NOTE — TELEPHONE ENCOUNTER
Last visit- 10/8/2024  Next visit- Visit date not found    Requested Prescriptions     Pending Prescriptions Disp Refills    budesonide-formoterol (SYMBICORT) 160-4.5 MCG/ACT AERO [Pharmacy Med Name: BUDESONIDE/FORMOT AER] 30.6 g 1     Sig: USE 1 TO 2 INHALATIONS TWICE A DAY AND RINSE MOUTH OUT AFTER USE      NEXT VISIT 10/9/25

## 2025-02-03 DIAGNOSIS — J45.909 UNCOMPLICATED ASTHMA, UNSPECIFIED ASTHMA SEVERITY, UNSPECIFIED WHETHER PERSISTENT: ICD-10-CM

## 2025-02-03 RX ORDER — BUDESONIDE AND FORMOTEROL FUMARATE DIHYDRATE 160; 4.5 UG/1; UG/1
AEROSOL RESPIRATORY (INHALATION)
Qty: 30.6 G | Refills: 3 | Status: SHIPPED | OUTPATIENT
Start: 2025-02-03

## 2025-02-24 ENCOUNTER — TELEPHONE (OUTPATIENT)
Dept: FAMILY MEDICINE CLINIC | Age: 75
End: 2025-02-24

## 2025-02-24 ENCOUNTER — OFFICE VISIT (OUTPATIENT)
Dept: FAMILY MEDICINE CLINIC | Age: 75
End: 2025-02-24
Payer: MEDICARE

## 2025-02-24 VITALS
WEIGHT: 225 LBS | HEIGHT: 74 IN | RESPIRATION RATE: 16 BRPM | HEART RATE: 80 BPM | TEMPERATURE: 98.4 F | OXYGEN SATURATION: 94 % | DIASTOLIC BLOOD PRESSURE: 78 MMHG | BODY MASS INDEX: 28.88 KG/M2 | SYSTOLIC BLOOD PRESSURE: 136 MMHG

## 2025-02-24 DIAGNOSIS — M25.512 ACUTE PAIN OF LEFT SHOULDER: ICD-10-CM

## 2025-02-24 DIAGNOSIS — J44.1 COPD EXACERBATION (HCC): Primary | ICD-10-CM

## 2025-02-24 PROCEDURE — G8417 CALC BMI ABV UP PARAM F/U: HCPCS | Performed by: FAMILY MEDICINE

## 2025-02-24 PROCEDURE — 3078F DIAST BP <80 MM HG: CPT | Performed by: FAMILY MEDICINE

## 2025-02-24 PROCEDURE — 1159F MED LIST DOCD IN RCRD: CPT | Performed by: FAMILY MEDICINE

## 2025-02-24 PROCEDURE — G8427 DOCREV CUR MEDS BY ELIG CLIN: HCPCS | Performed by: FAMILY MEDICINE

## 2025-02-24 PROCEDURE — 3075F SYST BP GE 130 - 139MM HG: CPT | Performed by: FAMILY MEDICINE

## 2025-02-24 PROCEDURE — 1160F RVW MEDS BY RX/DR IN RCRD: CPT | Performed by: FAMILY MEDICINE

## 2025-02-24 PROCEDURE — 99213 OFFICE O/P EST LOW 20 MIN: CPT | Performed by: FAMILY MEDICINE

## 2025-02-24 PROCEDURE — 1123F ACP DISCUSS/DSCN MKR DOCD: CPT | Performed by: FAMILY MEDICINE

## 2025-02-24 PROCEDURE — 3023F SPIROM DOC REV: CPT | Performed by: FAMILY MEDICINE

## 2025-02-24 PROCEDURE — 96372 THER/PROPH/DIAG INJ SC/IM: CPT | Performed by: FAMILY MEDICINE

## 2025-02-24 PROCEDURE — 1036F TOBACCO NON-USER: CPT | Performed by: FAMILY MEDICINE

## 2025-02-24 RX ORDER — AZITHROMYCIN 250 MG/1
TABLET, FILM COATED ORAL
Qty: 6 TABLET | Refills: 0 | Status: SHIPPED | OUTPATIENT
Start: 2025-02-24

## 2025-02-24 RX ORDER — TRIAMCINOLONE ACETONIDE 40 MG/ML
40 INJECTION, SUSPENSION INTRA-ARTICULAR; INTRAMUSCULAR ONCE
Status: COMPLETED | OUTPATIENT
Start: 2025-02-24 | End: 2025-02-24

## 2025-02-24 RX ADMIN — TRIAMCINOLONE ACETONIDE 40 MG: 40 INJECTION, SUSPENSION INTRA-ARTICULAR; INTRAMUSCULAR at 15:50

## 2025-02-24 SDOH — ECONOMIC STABILITY: FOOD INSECURITY: WITHIN THE PAST 12 MONTHS, YOU WORRIED THAT YOUR FOOD WOULD RUN OUT BEFORE YOU GOT MONEY TO BUY MORE.: NEVER TRUE

## 2025-02-24 SDOH — ECONOMIC STABILITY: FOOD INSECURITY: WITHIN THE PAST 12 MONTHS, THE FOOD YOU BOUGHT JUST DIDN'T LAST AND YOU DIDN'T HAVE MONEY TO GET MORE.: NEVER TRUE

## 2025-02-24 ASSESSMENT — PATIENT HEALTH QUESTIONNAIRE - PHQ9
SUM OF ALL RESPONSES TO PHQ QUESTIONS 1-9: 0
2. FEELING DOWN, DEPRESSED OR HOPELESS: NOT AT ALL
SUM OF ALL RESPONSES TO PHQ9 QUESTIONS 1 & 2: 0
SUM OF ALL RESPONSES TO PHQ QUESTIONS 1-9: 0
1. LITTLE INTEREST OR PLEASURE IN DOING THINGS: NOT AT ALL
SUM OF ALL RESPONSES TO PHQ QUESTIONS 1-9: 0
SUM OF ALL RESPONSES TO PHQ QUESTIONS 1-9: 0

## 2025-02-24 ASSESSMENT — ENCOUNTER SYMPTOMS
SHORTNESS OF BREATH: 1
SORE THROAT: 0
COUGH: 1
WHEEZING: 1

## 2025-02-24 NOTE — TELEPHONE ENCOUNTER
Patient called requesting an appt with Dr. Araujo. Schedule full. Patient stated he has been having flu/cold symptoms for 2+ weeks and has not been taking anything OTC. He states he gets it every year. He also stated he gets SOB along with \"the whole nine yards and it's kicking my butt\". No fevers that he knows of. Congestion, coughing up gunk, occasional SOB. Please advise. Uses [employer]. Can call patient back at [phone].

## 2025-02-24 NOTE — PROGRESS NOTES
[facility]  [address]  [place] [address]  Phone:  [phone]          Name: Chandler Rodríguez  : 1950    Chief Complaint   Patient presents with    Cough    Congestion    Shortness of Breath     X2 weeks    Shoulder Pain       HPI:     Chandler Rodríguez is a 75 y.o. male who presents today for evaluation of cough and congestion for the past 2 weeks.  No fevers.  No sore throat or otalgia.  No chest pain or palpitations.  He has not been taking any OTC cold medication.  He thinks this is the same infection he gets annually.      He has been having pain in bilateral shoulders since a fall a few weeks ago.  His left shoulder struck the running board and has been sore since.      Current Outpatient Medications:     azithromycin (ZITHROMAX Z-TERRANCE) 250 MG tablet, Take two (2) tablets by mouth on day 1, then take one (1) tablet by mouth daily for four (4) days., Disp: 6 tablet, Rfl: 0    budesonide-formoterol (SYMBICORT) 160-4.5 MCG/ACT AERO, USE 1 TO 2 INHALATIONS TWICE A DAY AND RINSE MOUTH OUT AFTER USE, Disp: 30.6 g, Rfl: 3    triamcinolone (KENALOG) 0.1 % cream, Apply topically 2 times daily., Disp: 30 g, Rfl: 0    Allergies   Allergen Reactions    Ace Inhibitors      Pt unsure of reaction       Subjective:      Review of Systems   Constitutional:  Negative for fever.   HENT:  Positive for congestion. Negative for ear pain and sore throat.    Respiratory:  Positive for cough, shortness of breath and wheezing.    Musculoskeletal:  Positive for arthralgias and myalgias.       Objective:     /78 (Site: Left Upper Arm, Position: Sitting, Cuff Size: Medium Adult)   Pulse 80   Temp 98.4 °F (36.9 °C) (Temporal)   Resp 16   Ht 1.88 m (6' 2.02\")   Wt 102.1 kg (225 lb)   SpO2 94%   BMI 28.88 kg/m²       Physical Exam  Vitals and nursing note reviewed.   Constitutional:       Appearance: He is well-developed.   HENT:      Head: Normocephalic and atraumatic.      Right Ear: Tympanic

## 2025-02-24 NOTE — PROGRESS NOTES
Administrations This Visit       triamcinolone acetonide (KENALOG-40) injection 40 mg       Admin Date  02/24/2025  15:50 Action  Given Dose  40 mg Route  IntraMUSCular Site  Dorsogluteal Right Documented By  Angela Morgan LPN    NDC: 8380-9554-99    Lot#: [id number]    : [employer] ([facility])    Patient Supplied?: No                    Patient instructed to remain in clinic for 20 minutes after injection and was advised to report any adverse reaction to me immediately.

## (undated) DEVICE — PAD,NON-ADHERENT,3X8,STERILE,LF,1/PK: Brand: MEDLINE

## (undated) DEVICE — BONE BIOPSY DEVICE F07A TAPERED SIZE 2: Brand: MEDTRONIC REUSABLE INSTRUMENTS

## (undated) DEVICE — APPLICATOR MEDICATED 26 CC SOLUTION CLR STRL CHLORAPREP

## (undated) DEVICE — COUNTER NDL 40 COUNT HLD 70 FOAM BLK ADH W/ MAG

## (undated) DEVICE — CURETTE A13A SIZE 2 T-TIP: Brand: KYPHON® EXPRESS ™ CURETTE

## (undated) DEVICE — NEEDLE SYR 18GA L1.5IN RED PLAS HUB S STL BLNT FILL W/O

## (undated) DEVICE — SPONGE GZ W4XL4IN COT 12 PLY TYP VII WVN C FLD DSGN

## (undated) DEVICE — TOWEL,OR,DSP,ST,BLUE,STD,4/PK,20PK/CS: Brand: MEDLINE

## (undated) DEVICE — PACK,SET UP,NO DRAPES: Brand: MEDLINE

## (undated) DEVICE — SYRINGE MED 10ML LUERLOCK TIP W/O SFTY DISP

## (undated) DEVICE — BAG,BANDED,W/RUBBERBAND,STERILE,30X36: Brand: MEDLINE

## (undated) DEVICE — KIT KEX152EB-CDS- 15/2 FF WITH CDS: Brand: KYPHPAK® FIRST FRACTURE TRAY

## (undated) DEVICE — SHEET, T, LAPAROTOMY, STERILE: Brand: MEDLINE

## (undated) DEVICE — GLOVE ORANGE PI 7   MSG9070

## (undated) DEVICE — HYPODERMIC SAFETY NEEDLE: Brand: MAGELLAN

## (undated) DEVICE — 3M™ STERI-STRIP™ COMPOUND BENZOIN TINCTURE 40 BAGS/CARTON 4 CARTONS/CASE C1544: Brand: 3M™ STERI-STRIP™

## (undated) DEVICE — NEEDLE SPNL 22GA L3.5IN BLK HUB S STL REG WALL FIT STYL W/

## (undated) DEVICE — GLOVE SURG SZ 65 THK91MIL LTX FREE SYN POLYISOPRENE

## (undated) DEVICE — GARMENT,MEDLINE,DVT,INT,CALF,MED, GEN2: Brand: MEDLINE

## (undated) DEVICE — MARKER,SKIN,WI/RULER AND LABELS: Brand: MEDLINE

## (undated) DEVICE — INTENDED FOR TISSUE SEPARATION, AND OTHER PROCEDURES THAT REQUIRE A SHARP SURGICAL BLADE TO PUNCTURE OR CUT.: Brand: BARD-PARKER ® CARBON RIB-BACK BLADES

## (undated) DEVICE — STRIP,CLOSURE,WOUND,MEDI-STRIP,1/2X4: Brand: MEDLINE

## (undated) DEVICE — GAUZE,SPONGE,8"X4",12PLY,XRAY,STRL,LF: Brand: MEDLINE

## (undated) DEVICE — SURE SET SINGLE BASIN-LF: Brand: MEDLINE INDUSTRIES, INC.

## (undated) DEVICE — BONE CEMENT CX01B KYPHON XPEDE W MXR US: Brand: KYPHON® XPEDE™ BONE CEMENT AND KYPHON® MIXER PACK

## (undated) DEVICE — GOWN,SIRUS,NON REINFRCD,LARGE,SET IN SL: Brand: MEDLINE